# Patient Record
Sex: MALE | Race: WHITE | Employment: OTHER | URBAN - NONMETROPOLITAN AREA
[De-identification: names, ages, dates, MRNs, and addresses within clinical notes are randomized per-mention and may not be internally consistent; named-entity substitution may affect disease eponyms.]

---

## 2019-03-30 ENCOUNTER — HOSPITAL ENCOUNTER (INPATIENT)
Age: 79
LOS: 4 days | Discharge: NURSING, REHAB OR LONG-TERM CARE | DRG: 640 | End: 2019-04-03
Attending: FAMILY MEDICINE | Admitting: FAMILY MEDICINE
Payer: MEDICARE

## 2019-03-30 ENCOUNTER — APPOINTMENT (OUTPATIENT)
Dept: MRI IMAGING | Age: 79
DRG: 640 | End: 2019-03-30
Payer: MEDICARE

## 2019-03-30 ENCOUNTER — APPOINTMENT (OUTPATIENT)
Dept: GENERAL RADIOLOGY | Age: 79
DRG: 640 | End: 2019-03-30
Payer: MEDICARE

## 2019-03-30 ENCOUNTER — APPOINTMENT (OUTPATIENT)
Dept: CT IMAGING | Age: 79
DRG: 640 | End: 2019-03-30
Payer: MEDICARE

## 2019-03-30 DIAGNOSIS — R26.2 IMPAIRED AMBULATION: ICD-10-CM

## 2019-03-30 DIAGNOSIS — R62.7 ADULT FAILURE TO THRIVE: Primary | ICD-10-CM

## 2019-03-30 DIAGNOSIS — R41.0 DELIRIUM: ICD-10-CM

## 2019-03-30 PROBLEM — G93.41 ENCEPHALOPATHY, METABOLIC: Status: ACTIVE | Noted: 2019-03-30

## 2019-03-30 PROBLEM — R26.81 UNSTABLE GAIT: Chronic | Status: ACTIVE | Noted: 2019-03-30

## 2019-03-30 PROBLEM — F02.80 LATE ONSET ALZHEIMER'S DISEASE WITHOUT BEHAVIORAL DISTURBANCE (HCC): Chronic | Status: ACTIVE | Noted: 2019-03-30

## 2019-03-30 PROBLEM — G30.1 LATE ONSET ALZHEIMER'S DISEASE WITHOUT BEHAVIORAL DISTURBANCE (HCC): Chronic | Status: ACTIVE | Noted: 2019-03-30

## 2019-03-30 PROBLEM — E86.0 DEHYDRATION: Status: ACTIVE | Noted: 2019-03-30

## 2019-03-30 PROBLEM — R29.6 MULTIPLE FALLS: Chronic | Status: ACTIVE | Noted: 2019-03-30

## 2019-03-30 PROBLEM — I10 ESSENTIAL HYPERTENSION: Chronic | Status: ACTIVE | Noted: 2019-03-30

## 2019-03-30 PROBLEM — E78.00 PURE HYPERCHOLESTEROLEMIA: Chronic | Status: ACTIVE | Noted: 2019-03-30

## 2019-03-30 PROBLEM — J43.1 PANLOBULAR EMPHYSEMA (HCC): Chronic | Status: ACTIVE | Noted: 2019-03-30

## 2019-03-30 PROBLEM — G93.41 METABOLIC ENCEPHALOPATHY: Status: ACTIVE | Noted: 2019-03-30

## 2019-03-30 LAB
ACETAMINOPHEN LEVEL: <15 UG/ML
ALBUMIN SERPL-MCNC: 3.1 G/DL (ref 3.5–5.2)
ALP BLD-CCNC: 53 U/L (ref 40–130)
ALT SERPL-CCNC: 14 U/L (ref 5–41)
AMMONIA: 55 UMOL/L (ref 16–60)
ANION GAP SERPL CALCULATED.3IONS-SCNC: 10 MMOL/L (ref 7–19)
APTT: 29.6 SEC (ref 26–36.2)
AST SERPL-CCNC: 24 U/L (ref 5–40)
BASOPHILS ABSOLUTE: 0 K/UL (ref 0–0.2)
BASOPHILS RELATIVE PERCENT: 0.5 % (ref 0–1)
BILIRUB SERPL-MCNC: 0.4 MG/DL (ref 0.2–1.2)
BILIRUBIN URINE: NEGATIVE
BLOOD, URINE: NEGATIVE
BUN BLDV-MCNC: 11 MG/DL (ref 8–23)
CALCIUM SERPL-MCNC: 9.1 MG/DL (ref 8.8–10.2)
CHLORIDE BLD-SCNC: 98 MMOL/L (ref 98–111)
CLARITY: CLEAR
CO2: 29 MMOL/L (ref 22–29)
COLOR: YELLOW
CREAT SERPL-MCNC: 0.7 MG/DL (ref 0.5–1.2)
EOSINOPHILS ABSOLUTE: 0.1 K/UL (ref 0–0.6)
EOSINOPHILS RELATIVE PERCENT: 1.7 % (ref 0–5)
ETHANOL: <10 MG/DL (ref 0–0.08)
FOLATE: 13.2 NG/ML (ref 4.5–32.2)
GFR NON-AFRICAN AMERICAN: >60
GLUCOSE BLD-MCNC: 115 MG/DL (ref 74–109)
GLUCOSE URINE: NEGATIVE MG/DL
HCT VFR BLD CALC: 42.3 % (ref 42–52)
HEMOGLOBIN: 14.5 G/DL (ref 14–18)
INR BLD: 1.11 (ref 0.88–1.18)
IRON SATURATION: 42 % (ref 14–50)
IRON: 84 UG/DL (ref 59–158)
KETONES, URINE: NEGATIVE MG/DL
LEUKOCYTE ESTERASE, URINE: NEGATIVE
LYMPHOCYTES ABSOLUTE: 1.1 K/UL (ref 1.1–4.5)
LYMPHOCYTES RELATIVE PERCENT: 13.6 % (ref 20–40)
MAGNESIUM: 1.9 MG/DL (ref 1.6–2.4)
MCH RBC QN AUTO: 33.1 PG (ref 27–31)
MCHC RBC AUTO-ENTMCNC: 34.3 G/DL (ref 33–37)
MCV RBC AUTO: 96.6 FL (ref 80–94)
MONOCYTES ABSOLUTE: 1 K/UL (ref 0–0.9)
MONOCYTES RELATIVE PERCENT: 11.8 % (ref 0–10)
NEUTROPHILS ABSOLUTE: 5.9 K/UL (ref 1.5–7.5)
NEUTROPHILS RELATIVE PERCENT: 72 % (ref 50–65)
NITRITE, URINE: NEGATIVE
PDW BLD-RTO: 14.2 % (ref 11.5–14.5)
PH UA: 7 (ref 5–8)
PLATELET # BLD: 245 K/UL (ref 130–400)
PMV BLD AUTO: 9.6 FL (ref 9.4–12.4)
POTASSIUM SERPL-SCNC: 3.6 MMOL/L (ref 3.5–5)
PRO-BNP: 280 PG/ML (ref 0–1800)
PROTEIN UA: NEGATIVE MG/DL
PROTHROMBIN TIME: 13.7 SEC (ref 12–14.6)
RBC # BLD: 4.38 M/UL (ref 4.7–6.1)
SALICYLATE, SERUM: <3 MG/DL (ref 3–10)
SODIUM BLD-SCNC: 137 MMOL/L (ref 136–145)
SPECIFIC GRAVITY UA: 1.02 (ref 1–1.03)
T4 TOTAL: 4.7 UG/DL (ref 4.5–11.7)
TOTAL IRON BINDING CAPACITY: 198 UG/DL (ref 250–400)
TOTAL PROTEIN: 5.9 G/DL (ref 6.6–8.7)
TROPONIN: <0.01 NG/ML (ref 0–0.03)
TSH SERPL DL<=0.05 MIU/L-ACNC: 2.82 UIU/ML (ref 0.27–4.2)
URINE REFLEX TO CULTURE: NORMAL
UROBILINOGEN, URINE: 1 E.U./DL
VITAMIN B-12: 1056 PG/ML (ref 211–946)
WBC # BLD: 8.2 K/UL (ref 4.8–10.8)

## 2019-03-30 PROCEDURE — 85025 COMPLETE CBC W/AUTO DIFF WBC: CPT

## 2019-03-30 PROCEDURE — 71045 X-RAY EXAM CHEST 1 VIEW: CPT

## 2019-03-30 PROCEDURE — 85730 THROMBOPLASTIN TIME PARTIAL: CPT

## 2019-03-30 PROCEDURE — 99285 EMERGENCY DEPT VISIT HI MDM: CPT

## 2019-03-30 PROCEDURE — 81003 URINALYSIS AUTO W/O SCOPE: CPT

## 2019-03-30 PROCEDURE — 85610 PROTHROMBIN TIME: CPT

## 2019-03-30 PROCEDURE — G0480 DRUG TEST DEF 1-7 CLASSES: HCPCS

## 2019-03-30 PROCEDURE — 83550 IRON BINDING TEST: CPT

## 2019-03-30 PROCEDURE — 70450 CT HEAD/BRAIN W/O DYE: CPT

## 2019-03-30 PROCEDURE — 82140 ASSAY OF AMMONIA: CPT

## 2019-03-30 PROCEDURE — 6370000000 HC RX 637 (ALT 250 FOR IP): Performed by: FAMILY MEDICINE

## 2019-03-30 PROCEDURE — 84443 ASSAY THYROID STIM HORMONE: CPT

## 2019-03-30 PROCEDURE — 87040 BLOOD CULTURE FOR BACTERIA: CPT

## 2019-03-30 PROCEDURE — 83880 ASSAY OF NATRIURETIC PEPTIDE: CPT

## 2019-03-30 PROCEDURE — 36415 COLL VENOUS BLD VENIPUNCTURE: CPT

## 2019-03-30 PROCEDURE — 70551 MRI BRAIN STEM W/O DYE: CPT

## 2019-03-30 PROCEDURE — 1210000000 HC MED SURG R&B

## 2019-03-30 PROCEDURE — 84436 ASSAY OF TOTAL THYROXINE: CPT

## 2019-03-30 PROCEDURE — 2580000003 HC RX 258: Performed by: FAMILY MEDICINE

## 2019-03-30 PROCEDURE — 6360000002 HC RX W HCPCS: Performed by: FAMILY MEDICINE

## 2019-03-30 PROCEDURE — 84484 ASSAY OF TROPONIN QUANT: CPT

## 2019-03-30 PROCEDURE — 83735 ASSAY OF MAGNESIUM: CPT

## 2019-03-30 PROCEDURE — 80053 COMPREHEN METABOLIC PANEL: CPT

## 2019-03-30 PROCEDURE — 82746 ASSAY OF FOLIC ACID SERUM: CPT

## 2019-03-30 PROCEDURE — 93880 EXTRACRANIAL BILAT STUDY: CPT

## 2019-03-30 PROCEDURE — 82607 VITAMIN B-12: CPT

## 2019-03-30 PROCEDURE — 99285 EMERGENCY DEPT VISIT HI MDM: CPT | Performed by: FAMILY MEDICINE

## 2019-03-30 PROCEDURE — 83540 ASSAY OF IRON: CPT

## 2019-03-30 PROCEDURE — 93005 ELECTROCARDIOGRAM TRACING: CPT

## 2019-03-30 RX ORDER — RIVASTIGMINE TARTRATE 3 MG/1
3 CAPSULE ORAL 2 TIMES DAILY
Status: DISCONTINUED | OUTPATIENT
Start: 2019-03-30 | End: 2019-04-03 | Stop reason: HOSPADM

## 2019-03-30 RX ORDER — ACETAMINOPHEN 325 MG/1
650 TABLET ORAL EVERY 4 HOURS PRN
Status: DISCONTINUED | OUTPATIENT
Start: 2019-03-30 | End: 2019-04-03 | Stop reason: HOSPADM

## 2019-03-30 RX ORDER — ONDANSETRON 2 MG/ML
4 INJECTION INTRAMUSCULAR; INTRAVENOUS EVERY 6 HOURS PRN
Status: DISCONTINUED | OUTPATIENT
Start: 2019-03-30 | End: 2019-04-03 | Stop reason: HOSPADM

## 2019-03-30 RX ORDER — ATORVASTATIN CALCIUM 10 MG/1
10 TABLET, FILM COATED ORAL DAILY
COMMUNITY

## 2019-03-30 RX ORDER — TRAZODONE HYDROCHLORIDE 100 MG/1
100 TABLET ORAL NIGHTLY
COMMUNITY

## 2019-03-30 RX ORDER — DIVALPROEX SODIUM 250 MG/1
250 TABLET, DELAYED RELEASE ORAL 3 TIMES DAILY
Status: DISCONTINUED | OUTPATIENT
Start: 2019-03-30 | End: 2019-04-03 | Stop reason: HOSPADM

## 2019-03-30 RX ORDER — MELATONIN 10 MG
CAPSULE ORAL
COMMUNITY

## 2019-03-30 RX ORDER — SODIUM CHLORIDE 0.9 % (FLUSH) 0.9 %
10 SYRINGE (ML) INJECTION PRN
Status: DISCONTINUED | OUTPATIENT
Start: 2019-03-30 | End: 2019-04-03 | Stop reason: HOSPADM

## 2019-03-30 RX ORDER — LISINOPRIL 10 MG/1
10 TABLET ORAL DAILY
COMMUNITY

## 2019-03-30 RX ORDER — QUETIAPINE FUMARATE 50 MG/1
50 TABLET, EXTENDED RELEASE ORAL NIGHTLY
Status: DISCONTINUED | OUTPATIENT
Start: 2019-03-30 | End: 2019-04-03 | Stop reason: HOSPADM

## 2019-03-30 RX ORDER — TRAZODONE HYDROCHLORIDE 100 MG/1
100 TABLET ORAL NIGHTLY
Status: DISCONTINUED | OUTPATIENT
Start: 2019-03-30 | End: 2019-04-03 | Stop reason: HOSPADM

## 2019-03-30 RX ORDER — LORAZEPAM 2 MG/ML
1 INJECTION INTRAMUSCULAR EVERY 6 HOURS PRN
Status: DISCONTINUED | OUTPATIENT
Start: 2019-03-30 | End: 2019-04-03 | Stop reason: HOSPADM

## 2019-03-30 RX ORDER — ASPIRIN 325 MG
325 TABLET ORAL DAILY
Status: DISCONTINUED | OUTPATIENT
Start: 2019-03-30 | End: 2019-04-03 | Stop reason: HOSPADM

## 2019-03-30 RX ORDER — QUETIAPINE FUMARATE 50 MG/1
50 TABLET, EXTENDED RELEASE ORAL NIGHTLY
Status: ON HOLD | COMMUNITY
End: 2019-04-09 | Stop reason: HOSPADM

## 2019-03-30 RX ORDER — LISINOPRIL 10 MG/1
10 TABLET ORAL DAILY
Status: DISCONTINUED | OUTPATIENT
Start: 2019-03-30 | End: 2019-04-02

## 2019-03-30 RX ORDER — ATORVASTATIN CALCIUM 10 MG/1
10 TABLET, FILM COATED ORAL DAILY
Status: DISCONTINUED | OUTPATIENT
Start: 2019-03-30 | End: 2019-04-03 | Stop reason: HOSPADM

## 2019-03-30 RX ORDER — SODIUM CHLORIDE 9 MG/ML
INJECTION, SOLUTION INTRAVENOUS CONTINUOUS
Status: DISCONTINUED | OUTPATIENT
Start: 2019-03-30 | End: 2019-04-03 | Stop reason: HOSPADM

## 2019-03-30 RX ORDER — SODIUM CHLORIDE 0.9 % (FLUSH) 0.9 %
10 SYRINGE (ML) INJECTION EVERY 12 HOURS SCHEDULED
Status: DISCONTINUED | OUTPATIENT
Start: 2019-03-30 | End: 2019-04-03 | Stop reason: HOSPADM

## 2019-03-30 RX ORDER — MEMANTINE HYDROCHLORIDE 5 MG/1
10 TABLET ORAL 2 TIMES DAILY
Status: DISCONTINUED | OUTPATIENT
Start: 2019-03-30 | End: 2019-04-03 | Stop reason: HOSPADM

## 2019-03-30 RX ORDER — MEMANTINE HYDROCHLORIDE 10 MG/1
10 TABLET ORAL DAILY
COMMUNITY

## 2019-03-30 RX ORDER — ASPIRIN 325 MG
325 TABLET ORAL DAILY
COMMUNITY

## 2019-03-30 RX ORDER — DIVALPROEX SODIUM 250 MG/1
250 TABLET, DELAYED RELEASE ORAL 3 TIMES DAILY
COMMUNITY

## 2019-03-30 RX ORDER — HYDROCHLOROTHIAZIDE 25 MG/1
25 TABLET ORAL DAILY
COMMUNITY

## 2019-03-30 RX ORDER — SODIUM CHLORIDE 9 MG/ML
INJECTION, SOLUTION INTRAVENOUS CONTINUOUS
Status: DISCONTINUED | OUTPATIENT
Start: 2019-03-30 | End: 2019-03-30

## 2019-03-30 RX ORDER — HYDROCODONE BITARTRATE AND ACETAMINOPHEN 10; 325 MG/1; MG/1
1 TABLET ORAL EVERY 6 HOURS PRN
Status: ON HOLD | COMMUNITY
End: 2019-04-03 | Stop reason: HOSPADM

## 2019-03-30 RX ORDER — RIVASTIGMINE TARTRATE 3 MG/1
3 CAPSULE ORAL 2 TIMES DAILY
COMMUNITY

## 2019-03-30 RX ADMIN — MEMANTINE 10 MG: 5 TABLET ORAL at 20:38

## 2019-03-30 RX ADMIN — TRAZODONE HYDROCHLORIDE 100 MG: 100 TABLET ORAL at 20:38

## 2019-03-30 RX ADMIN — RIVASTIGMINE TARTRATE 3 MG: 3 CAPSULE ORAL at 20:38

## 2019-03-30 RX ADMIN — ENOXAPARIN SODIUM 40 MG: 40 INJECTION SUBCUTANEOUS at 18:51

## 2019-03-30 RX ADMIN — QUETIAPINE FUMARATE 50 MG: 50 TABLET, EXTENDED RELEASE ORAL at 20:38

## 2019-03-30 RX ADMIN — DIVALPROEX SODIUM 250 MG: 250 TABLET, DELAYED RELEASE ORAL at 20:38

## 2019-03-30 RX ADMIN — Medication 10 ML: at 20:39

## 2019-03-30 SDOH — HEALTH STABILITY: MENTAL HEALTH: HOW OFTEN DO YOU HAVE A DRINK CONTAINING ALCOHOL?: MONTHLY OR LESS

## 2019-03-30 SDOH — HEALTH STABILITY: MENTAL HEALTH: HOW MANY STANDARD DRINKS CONTAINING ALCOHOL DO YOU HAVE ON A TYPICAL DAY?: 1 OR 2

## 2019-03-30 ASSESSMENT — PAIN SCALES - GENERAL
PAINLEVEL_OUTOF10: 0
PAINLEVEL_OUTOF10: 0

## 2019-03-30 ASSESSMENT — ENCOUNTER SYMPTOMS
ABDOMINAL PAIN: 0
SHORTNESS OF BREATH: 0

## 2019-03-30 NOTE — ED PROVIDER NOTES
[facility] 5 SURG SERVICES  eMERGENCY dEPARTMENT eNCOUnter      Pt Name: Kate Mcraer  MRN: [id number]  Charlenegfcelina 1940  Date of evaluation: 3/30/2019  Provider: Mp Sweeney MD    CHIEF COMPLAINT       Chief Complaint   Patient presents with    Failure To Thrive     from home per dr Gabriella Rey for [facility] home placement. pt is confused and disoriented, has a hx of dementia         HISTORY OF PRESENT ILLNESS   (Location/Symptom, Timing/Onset,Context/Setting, Quality, Duration, Modifying Factors, Severity)  Note limiting factors. Kate Officer is a 67 y.o. male who presents to the emergency department      Patient sent out today for failure to thrive. Story of dementia. That he has \"not been doing so well\". When asked to elaborate indicates he has not been able to walk around or take care of himself he feels very weak and not remember if he ate breakfast and quickly forgets what time of day it is after he has told repeatedly. The patient does look like he has poorly kept and, Looks pale and dehydrated. NursingNotes were reviewed. REVIEW OF SYSTEMS    (2-9 systems for level 4, 10 or more for level 5)     Review of Systems   Constitutional: Positive for activity change and fatigue. HENT: Negative for congestion. Respiratory: Negative for shortness of breath. Cardiovascular: Negative for chest pain. Gastrointestinal: Negative for abdominal pain. Genitourinary:        Patient answers \"I don't know\" to questions about pain with urination or frequency   Musculoskeletal: Positive for gait problem. Skin: Positive for pallor. Neurological: Positive for weakness. Psychiatric/Behavioral: Positive for confusion, decreased concentration and sleep disturbance.             PAST MEDICALHISTORY     Past Medical History:   Diagnosis Date    Alzheimer's dementia     COPD with emphysema ([facility] [address]     Hyperlipidemia     Hypertension     Osteoarthritis     Sleep disturbance          SURGICAL HISTORY No past surgical history on file. CURRENT MEDICATIONS     Current Discharge Medication List      CONTINUE these medications which have NOT CHANGED    Details   lisinopril (PRINIVIL;ZESTRIL) 10 MG tablet Take 10 mg by mouth daily      aspirin 325 MG tablet Take 325 mg by mouth daily      hydrochlorothiazide (HYDRODIURIL) 25 MG tablet Take 25 mg by mouth daily      atorvastatin (LIPITOR) 10 MG tablet Take 10 mg by mouth daily      QUEtiapine (SEROQUEL XR) 50 MG extended release tablet Take 50 mg by mouth nightly      divalproex (DEPAKOTE) 250 MG DR tablet Take 250 mg by mouth 3 times daily      Melatonin 10 MG CAPS Take by mouth      HYDROcodone-acetaminophen (NORCO)  MG per tablet Take 1 tablet by mouth every 6 hours as needed for Pain. memantine (NAMENDA) 10 MG tablet Take 10 mg by mouth daily       traZODone (DESYREL) 100 MG tablet Take 100 mg by mouth nightly      rivastigmine (EXELON) 3 MG capsule Take 3 mg by mouth 2 times daily             ALLERGIES     Patient has no known allergies. FAMILY HISTORY     No family history on file. SOCIAL HISTORY       Social History     Socioeconomic History    Marital status:      Spouse name: None    Number of children: None    Years of education: None    Highest education level: None   Occupational History    None   Social Needs    Financial resource strain: None    Food insecurity:     Worry: None     Inability: None    Transportation needs:     Medical: None     Non-medical: None   Tobacco Use    Smoking status: Current Every Day Smoker     Types: Cigarettes     Start date: 1954    Smokeless tobacco: Never Used   Substance and Sexual Activity    Alcohol use:  Yes     Alcohol/week: 2.4 oz     Types: 4 Glasses of wine per week     Frequency: Monthly or less     Drinks per session: 1 or 2     Binge frequency: Never    Drug use: Never    Sexual activity: Not Currently   Lifestyle    Physical activity:     Days per week: None Minutes per session: None    Stress: None   Relationships    Social connections:     Talks on phone: None     Gets together: None     Attends Rastafari service: None     Active member of club or organization: None     Attends meetings of clubs or organizations: None     Relationship status: None    Intimate partner violence:     Fear of current or ex partner: None     Emotionally abused: None     Physically abused: None     Forced sexual activity: None   Other Topics Concern    None   Social History Narrative    None       SCREENINGS    Luna Coma Scale  Eye Opening: Spontaneous  Best Verbal Response: Confused  Best Motor Response: Obeys commands  Luna Coma Scale Score: 14        PHYSICAL EXAM    (up to 7 for level 4, 8 or more for level 5)     ED Triage Vitals [03/30/19 1245]   BP Temp Temp Source Pulse Resp SpO2 Height Weight   128/88 97.8 °F (36.6 °C) Oral 73 20 90 % -- 200 lb (90.7 kg)       Physical Exam   Constitutional: He appears cachectic. He is cooperative. He is easily aroused. HENT:   Head: Normocephalic. Mouth/Throat: Mucous membranes are dry. Neck: Normal range of motion. Cardiovascular: Normal rate and normal heart sounds. Pulmonary/Chest: Effort normal and breath sounds normal.   Abdominal: Soft. Bowel sounds are normal.   Neurological: He is alert and easily aroused. He is disoriented. No cranial nerve deficit. GCS eye subscore is 4. GCS verbal subscore is 4. GCS motor subscore is 6. Reflex Scores:       Patellar reflexes are 1+ on the right side and 1+ on the left side. Generalized motor weakness no focal issue found   Skin: There is pallor. Psychiatric: He has a normal mood and affect.        DIAGNOSTIC RESULTS     EKG: All EKG's areinterpreted by the Emergency Department Physician who either signs or Co-signs this chart in the absence of a cardiologist.        RADIOLOGY:  Non-plain film images such as CT, Ultrasound and MRI are read by the radiologist. Plain radiographic images are visualized and preliminarily interpreted bythe emergency physician with the below findings:          CT Head WO Contrast   Final Result   No acute findings. Signed by Dr Michoacano Figueroa on 3/30/2019 2:08 PM      XR CHEST PORTABLE   Final Result   Suboptimal technique. Grossly clear lungs. Signed by Dr Michoacano Figueroa on 3/30/2019 1:21 PM      VL DUP CAROTID BILATERAL    (Results Pending)   MRI Brain WO Contrast    (Results Pending)           LABS:  Labs Reviewed   CBC WITH AUTO DIFFERENTIAL - Abnormal; Notable for the following components:       Result Value    RBC 4.38 (*)     MCV 96.6 (*)     MCH 33.1 (*)     Neutrophils % 72.0 (*)     Lymphocytes % 13.6 (*)     Monocytes % 11.8 (*)     Monocytes # 1.00 (*)     All other components within normal limits   COMPREHENSIVE METABOLIC PANEL - Abnormal; Notable for the following components:    Glucose 115 (*)     Total Protein 5.9 (*)     Alb 3.1 (*)     All other components within normal limits   VITAMIN B12 - Abnormal; Notable for the following components:    Vitamin B-12 1056 (*)     All other components within normal limits   IRON AND TIBC - Abnormal; Notable for the following components:    TIBC 198 (*)     All other components within normal limits   CULTURE BLOOD #1   CULTURE BLOOD #2   TROPONIN   URINE RT REFLEX TO CULTURE   ETHANOL   SALICYLATE LEVEL   ACETAMINOPHEN LEVEL   T4   TSH WITHOUT REFLEX   BRAIN NATRIURETIC PEPTIDE   PROTIME-INR   APTT   AMMONIA   MAGNESIUM   FOLATE   VALPROIC ACID LEVEL, TOTAL   COMPREHENSIVE METABOLIC PANEL W/ REFLEX TO MG FOR LOW K   CBC WITH AUTO DIFFERENTIAL       All other labs were within normal range or not returned as of this dictation.     EMERGENCY DEPARTMENT COURSE and DIFFERENTIAL DIAGNOSIS/MDM:   Vitals:    Vitals:    03/30/19 1541 03/30/19 1647 03/30/19 1807 03/30/19 1827   BP: 120/87 121/76 (!) 158/98    Pulse: 72 79 69    Resp: 20 20 17    Temp: 97.5 °F (36.4 °C) 98.5 °F (36.9 °C) 97.6 °F (36.4 °C)    TempSrc: Temporal Oral Temporal    SpO2: 97% 97% 94%    Weight:    200 lb (90.7 kg)   Height:    5' 11\" (1.803 m)       MDM    Reassessment      CONSULTS:  IP CONSULT TO PALLIATIVE CARE  IP CONSULT TO SOCIAL WORK    PROCEDURES:  Unless otherwise noted below, none     Procedures    FINAL IMPRESSION      1. Adult failure to thrive    2. Impaired ambulation    3.  Delirium          DISPOSITION/PLAN   DISPOSITION Admitted 03/30/2019 04:17:23 PM      PATIENT REFERRED TO:  Tyrone Parker MD  [address] [id number] [phone]            DISCHARGE MEDICATIONS:  Current Discharge Medication List             (Please note that portions of this note were completed with a voice recognition program.  Efforts were made to edit thedictations but occasionally words are mis-transcribed.)    Hong Goetz MD (electronically signed)  Attending Emergency Physician         Shwetha Higuera MD  03/30/19 1036

## 2019-03-30 NOTE — H&P
[facility]  History and Physical    Patient:  Apple Vizcaino  MRN: [id number]    CHIEF COMPLAINT:  Multiple falls and mental status changes      PCP: Linda Schmidt MD    HISTORY OF PRESENT ILLNESS:   The patient is a 67 y.o. male who presents with multiple falls over the past couple of days. An \"not acting himself\". This morning they tried to get him out of bed and he could not bear weight and could not ambulate. He had a difficult time communicating with them as well. He slowly returned back to his usual self and then started acting more confused. He has some underlying dementia but this was different according to the family members. They brought into the emergency room and he was diagnosed with metabolic encephalopathy elected to be admitted for further delineation of care. The family states he is declined over the past week to 10 days but over the past couple days if noticing he cannot bear weight not follow commands and had significant change in mentation. There are no other precipitating or relieving factors. REVIEW OF SYSTEMS:    Past Medical History:      Diagnosis Date    Alzheimer's dementia     COPD with [facility]ysema ([facility] [address]     Hyperlipidemia     Hypertension     Osteoarthritis     Sleep disturbance        Past Surgical History:  No past surgical history on file. Medications Prior to Admission:    Prior to Admission medications    Medication Sig Start Date End Date Taking?  Authorizing Provider   lisinopril (PRINIVIL;ZESTRIL) 10 MG tablet Take 10 mg by mouth daily   Yes Historical Provider, MD   aspirin 325 MG tablet Take 325 mg by mouth daily   Yes Historical Provider, MD   hydrochlorothiazide (HYDRODIURIL) 25 MG tablet Take 25 mg by mouth daily   Yes Historical Provider, MD   atorvastatin (LIPITOR) 10 MG tablet Take 10 mg by mouth daily   Yes Historical Provider, MD   QUEtiapine (SEROQUEL XR) 50 MG extended release tablet Take 50 mg by mouth nightly   Yes Historical Provider, MD Not on file   Social History Narrative    Not on file       Family History:   No family history on file. Review of systems:    Con: no fever/chills or significant weight changes   Heent: denies HA, change in vision or hearing. No significant sinus drainage. They have noticed him having more difficulty swallowing. CV: denies CP, dyspnea on exertion, Palpitations. No change in exercise tolerance, he is not very active at home. Pulm: No cough, sputum production, denies hemoptysis -underlying COPD emphysema noted  GI: denies changes in bowel habits, no diarrhea or significant constipation. No BRBPR       or melena. He is incontinent of stool at times  : no dysuria, hesitancy or dysuria. Denies hematuria. Is incontinent of urine at times  Derm: no rashes or new lesions of concern. Psych: He does have some restlessness in the evening hours consistent with sundowning  Neuro: They have noticed she's not following directions and not as coherent as his baseline over the past couple of days. A full 14 point review of systems is otherwise negative outside listed above and HPI      Physical Exam:    Vitals: /87   Pulse 72   Temp 97.5 °F (36.4 °C) (Temporal)   Resp 20   Wt 200 lb (90.7 kg)   SpO2 97%     GENERAL: WD/WN not in acute distress, resting well in bed  HEENT: NC/AT PERRL, EOMI grossly, TM's clear, OP negative without sig erythema or exudate, neck is supple without masses or carotid bruit noted    CV: normal S1 and S2, no sig murmur, lift or gallop, normal PMI  CHEST: clear to auscultation both anterior and posterior, no rales rhonchi or wheeze appreciated. ABD: soft NT/ND with normoactive BS noted. No guarding or rebounding noted  /rectal: deferred  EXT: no cyanosis or clubbing noted, normal ROM  DERM: skin is warm and dry without sig rashes on exposed areas  PSYCH: Affect seems to be normal he does have some depression over his deterioration of health.   NEURO: CN 2-12 apper grossly intact without sig deficits in motor or sensory, he does have a remarkably impaired short-term memory       CBC:   Recent Labs     03/30/19  1308   WBC 8.2   HGB 14.5        BMP:    Recent Labs     03/30/19  1308      K 3.6   CL 98   CO2 29   BUN 11   CREATININE 0.7   GLUCOSE 115*     Hepatic:   Recent Labs     03/30/19  1308   AST 24   ALT 14   BILITOT 0.4   ALKPHOS 53     Troponin T:   Recent Labs     03/30/19  1308   TROPONINI <0.01     Pro-BNP: No results for input(s): BNP in the last 72 hours. Lipids: No results for input(s): CHOL, HDL in the last 72 hours. Invalid input(s): LDLCALCU  ABGs: No results found for: PHART, PO2ART, [id number]  INR:   Recent Labs     03/30/19  1308   INR 1.11     -----------------------------------------------------------------  EKG: No significant changes noted  Radiology:     Ct Head Wo Contrast    Result Date: 3/30/2019  History: 71-year-old with altered mental status. Reference None available. Technique Unenhanced CT imaging of the brain/head was performed. Automated exposure control was utilized to limit radiation dose.  mGy-cm Findings No acute infarction, hemorrhage, or mass. Diffuse moderate atrophy. No hydrocephalus. No extra-axial collection. Mild chronic small vessel ischemia the cerebral white matter, not greater than expected for age. 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 The basal cisterns are clear. Mild mucosal thickening in the mid/posterior right ethmoid air cells. The calvarium is intact. No acute findings. Signed by Dr Los Jansen on 3/30/2019 2:08 PM    Xr Chest Portable    Result Date: 3/30/2019  History: 71-year-old with congestion. Reference: None. Findings: Frontal chest radiograph performed. Patient is rotated and leaning. Heart is not well assessed as a result. Lungs are grossly clear. No pleural effusion or pneumothorax. No fractures. Suboptimal technique. Grossly clear lungs. Signed by Dr Jones on 3/30/2019 1:21 PM      Assessment and Plan   1. Active Problems:    Encephalopathy, metabolic    Dehydration    Multiple falls    Late onset Alzheimer's disease without behavioral disturbance    Unstable gait    Essential hypertension    Pure hypercholesterolemia    Panlobular emphysema (HCC)  Resolved Problems:    * No resolved hospital problems. *  Mental status changes-metabolic encephalopathy. I don't know if this is related to his old stroke in progression of his underlying dementia or an acute event going on. We're going to culture his blood and urine and place him on broad-spectrum IV antibiotics. Will get physical therapy and occupational therapy to evaluate. He's had more difficulty swallowing so we'll get a speech eval along with a formal Mini-Mental Status exam. We'll complete the workup with carotid Dopplers echocardiogram and MRI of the brain. A Mago Joyce and open conversation held with the family concerning long-term program prognosis and expected deterioration of clinical condition over time. Everyone is aware and agrees with the treatment plan as outlined above.     Miguel Nguyễn  .  3/30/2019

## 2019-03-30 NOTE — PROGRESS NOTES
4 Eyes Skin Assessment    Fadi Pennington is being assessed upon: Admission    I agree that I, Rick Hudson, along with Marry Ornelas RN (either 2 RN's or 1 LPN and 1 RN) have performed a thorough Head to Toe Skin Assessment on the patient. ALL assessment sites listed below have been assessed. Areas assessed by both nurses:     [x]   Head, Face, and Ears   [x]   Shoulders, Back, and Chest  [x]   Arms, Elbows, and Hands   [x]   Coccyx, Sacrum, and Ischium  [x]   Legs, Feet, and Heels    Does the Patient have Skin Breakdown? Yes, wound(s) noted upon assessment. It is the responsibility of the Primary Nurse to assure that the following documentation, preventions, orders, and consults are complete on the above noted wound(s): Wound LDA initiated. LDA Flowsheet Documentation includes the Loree-wound, Wound Assessment, Measurements, Dressing Treatment, Drainage, and Color.    Bilateral Ankles redness  Right Hip stage 2 pressure sore  Redness on Bilat Elbows    Braulio Prevention initiated: Yes  Wound Care Orders initiated: Yes    [id number]  nurse consulted for Pressure Injury (Stage 3,4, Unstageable, DTI, NWPT, and Complex wounds) and New or Established Ostomies: No        Primary Nurse eSignature: Rick Hudson RN on 3/30/2019 at 6:38 PM      Co-Signer eSignature: Electronically signed by Skip Sifuentes LPN on 8/75/72 at 5:58 PM

## 2019-03-30 NOTE — ED NOTES
Bed: 05  Expected date:   Expected time:   Means of arrival:   Comments:  EMS     Guanako Marquez, INDRA  03/30/19 1112

## 2019-03-31 LAB
ALBUMIN SERPL-MCNC: 3.1 G/DL (ref 3.5–5.2)
ALP BLD-CCNC: 58 U/L (ref 40–130)
ALT SERPL-CCNC: 16 U/L (ref 5–41)
ANION GAP SERPL CALCULATED.3IONS-SCNC: 13 MMOL/L (ref 7–19)
AST SERPL-CCNC: 25 U/L (ref 5–40)
BASOPHILS ABSOLUTE: 0.1 K/UL (ref 0–0.2)
BASOPHILS RELATIVE PERCENT: 0.5 % (ref 0–1)
BILIRUB SERPL-MCNC: 0.4 MG/DL (ref 0.2–1.2)
BUN BLDV-MCNC: 9 MG/DL (ref 8–23)
CALCIUM SERPL-MCNC: 9.4 MG/DL (ref 8.8–10.2)
CHLORIDE BLD-SCNC: 96 MMOL/L (ref 98–111)
CO2: 31 MMOL/L (ref 22–29)
CREAT SERPL-MCNC: 0.6 MG/DL (ref 0.5–1.2)
EKG P AXIS: NORMAL DEGREES
EKG P-R INTERVAL: NORMAL MS
EKG Q-T INTERVAL: 418 MS
EKG QRS DURATION: 118 MS
EKG QTC CALCULATION (BAZETT): 440 MS
EKG T AXIS: 166 DEGREES
EOSINOPHILS ABSOLUTE: 0 K/UL (ref 0–0.6)
EOSINOPHILS RELATIVE PERCENT: 0.4 % (ref 0–5)
GFR NON-AFRICAN AMERICAN: >60
GLUCOSE BLD-MCNC: 101 MG/DL (ref 74–109)
HCT VFR BLD CALC: 44 % (ref 42–52)
HEMOGLOBIN: 15.1 G/DL (ref 14–18)
LV EF: 55 %
LVEF MODALITY: NORMAL
LYMPHOCYTES ABSOLUTE: 1 K/UL (ref 1.1–4.5)
LYMPHOCYTES RELATIVE PERCENT: 10.3 % (ref 20–40)
MAGNESIUM: 1.9 MG/DL (ref 1.6–2.4)
MCH RBC QN AUTO: 32.5 PG (ref 27–31)
MCHC RBC AUTO-ENTMCNC: 34.3 G/DL (ref 33–37)
MCV RBC AUTO: 94.8 FL (ref 80–94)
MONOCYTES ABSOLUTE: 1.2 K/UL (ref 0–0.9)
MONOCYTES RELATIVE PERCENT: 11.9 % (ref 0–10)
NEUTROPHILS ABSOLUTE: 7.6 K/UL (ref 1.5–7.5)
NEUTROPHILS RELATIVE PERCENT: 76.5 % (ref 50–65)
PDW BLD-RTO: 14 % (ref 11.5–14.5)
PLATELET # BLD: 269 K/UL (ref 130–400)
PMV BLD AUTO: 9.9 FL (ref 9.4–12.4)
POTASSIUM REFLEX MAGNESIUM: 3.2 MMOL/L (ref 3.5–5)
RBC # BLD: 4.64 M/UL (ref 4.7–6.1)
SODIUM BLD-SCNC: 140 MMOL/L (ref 136–145)
TOTAL PROTEIN: 6.1 G/DL (ref 6.6–8.7)
VALPROIC ACID LEVEL: 42.2 UG/ML (ref 50–100)
WBC # BLD: 10 K/UL (ref 4.8–10.8)

## 2019-03-31 PROCEDURE — 51798 US URINE CAPACITY MEASURE: CPT

## 2019-03-31 PROCEDURE — 2580000003 HC RX 258: Performed by: FAMILY MEDICINE

## 2019-03-31 PROCEDURE — 2700000000 HC OXYGEN THERAPY PER DAY

## 2019-03-31 PROCEDURE — 1210000000 HC MED SURG R&B

## 2019-03-31 PROCEDURE — 51701 INSERT BLADDER CATHETER: CPT

## 2019-03-31 PROCEDURE — 6370000000 HC RX 637 (ALT 250 FOR IP): Performed by: PHYSICIAN ASSISTANT

## 2019-03-31 PROCEDURE — 94660 CPAP INITIATION&MGMT: CPT

## 2019-03-31 PROCEDURE — C8929 TTE W OR WO FOL WCON,DOPPLER: HCPCS

## 2019-03-31 PROCEDURE — 85025 COMPLETE CBC W/AUTO DIFF WBC: CPT

## 2019-03-31 PROCEDURE — 80053 COMPREHEN METABOLIC PANEL: CPT

## 2019-03-31 PROCEDURE — 93880 EXTRACRANIAL BILAT STUDY: CPT

## 2019-03-31 PROCEDURE — 6370000000 HC RX 637 (ALT 250 FOR IP): Performed by: FAMILY MEDICINE

## 2019-03-31 PROCEDURE — 6360000004 HC RX CONTRAST MEDICATION: Performed by: INTERNAL MEDICINE

## 2019-03-31 PROCEDURE — 83735 ASSAY OF MAGNESIUM: CPT

## 2019-03-31 PROCEDURE — 36415 COLL VENOUS BLD VENIPUNCTURE: CPT

## 2019-03-31 PROCEDURE — 6360000002 HC RX W HCPCS: Performed by: FAMILY MEDICINE

## 2019-03-31 PROCEDURE — 80164 ASSAY DIPROPYLACETIC ACD TOT: CPT

## 2019-03-31 RX ORDER — POTASSIUM CHLORIDE 20 MEQ/1
40 TABLET, EXTENDED RELEASE ORAL ONCE
Status: COMPLETED | OUTPATIENT
Start: 2019-03-31 | End: 2019-03-31

## 2019-03-31 RX ADMIN — LORAZEPAM 1 MG: 2 INJECTION INTRAMUSCULAR; INTRAVENOUS at 11:02

## 2019-03-31 RX ADMIN — LORAZEPAM 1 MG: 2 INJECTION INTRAMUSCULAR; INTRAVENOUS at 00:15

## 2019-03-31 RX ADMIN — Medication 10 ML: at 21:00

## 2019-03-31 RX ADMIN — POTASSIUM CHLORIDE 40 MEQ: 20 TABLET, EXTENDED RELEASE ORAL at 11:02

## 2019-03-31 RX ADMIN — LISINOPRIL 10 MG: 10 TABLET ORAL at 07:55

## 2019-03-31 RX ADMIN — PERFLUTREN 2.2 MG: 6.52 INJECTION, SUSPENSION INTRAVENOUS at 10:44

## 2019-03-31 RX ADMIN — ATORVASTATIN CALCIUM 10 MG: 10 TABLET, FILM COATED ORAL at 07:56

## 2019-03-31 RX ADMIN — ENOXAPARIN SODIUM 40 MG: 40 INJECTION SUBCUTANEOUS at 19:04

## 2019-03-31 RX ADMIN — SODIUM CHLORIDE: 9 INJECTION, SOLUTION INTRAVENOUS at 19:53

## 2019-03-31 RX ADMIN — DIVALPROEX SODIUM 250 MG: 250 TABLET, DELAYED RELEASE ORAL at 07:56

## 2019-03-31 RX ADMIN — RIVASTIGMINE TARTRATE 3 MG: 3 CAPSULE ORAL at 07:55

## 2019-03-31 RX ADMIN — ASPIRIN 325 MG ORAL TABLET 325 MG: 325 PILL ORAL at 07:56

## 2019-03-31 RX ADMIN — MEMANTINE 10 MG: 5 TABLET ORAL at 07:55

## 2019-03-31 RX ADMIN — ONDANSETRON HYDROCHLORIDE 4 MG: 2 INJECTION, SOLUTION INTRAVENOUS at 00:15

## 2019-03-31 NOTE — PROGRESS NOTES
PRELIMINARY REPORT    RT CCA= 83/10      LT CCA= 86/8        ICA=  69/14             ICA= 80/16  BILATERAL ANTEGRADE VERTEBRAL ARTERIES    PENDING FINAL REPORT

## 2019-03-31 NOTE — PROGRESS NOTES
[facility]  Daily Progress Note  Apple Vizcaino  MRN: [id number] LOS: 1    Admit Date: 3/30/2019   3/31/2019 8:25 AM          CC: remains pleasantly confused      Interval History:    Reviewed overnight events and nursing notes. Status:  improved  Denies any chest pain or shortness of breath  Reviewed workup thus far  No new complaints voiced    DIET:  DIET DENTAL SOFT;    Medications:      sodium chloride        aspirin  325 mg Oral Daily    atorvastatin  10 mg Oral Daily    divalproex  250 mg Oral TID    lisinopril  10 mg Oral Daily    memantine  10 mg Oral BID    QUEtiapine  50 mg Oral Nightly    rivastigmine  3 mg Oral BID    traZODone  100 mg Oral Nightly    sodium chloride flush  10 mL Intravenous 2 times per day    enoxaparin  40 mg Subcutaneous Q24H       Data:     Code Status: DNR-CC    No family history on file. Social History     Socioeconomic History    Marital status:      Spouse name: Not on file    Number of children: Not on file    Years of education: Not on file    Highest education level: Not on file   Occupational History    Not on file   Social Needs    Financial resource strain: Not on file    Food insecurity:     Worry: Not on file     Inability: Not on file    Transportation needs:     Medical: Not on file     Non-medical: Not on file   Tobacco Use    Smoking status: Current Every Day Smoker     Types: Cigarettes     Start date: 12    Smokeless tobacco: Never Used   Substance and Sexual Activity    Alcohol use:  Yes     Alcohol/week: 2.4 oz     Types: 4 Glasses of wine per week     Frequency: Monthly or less     Drinks per session: 1 or 2     Binge frequency: Never    Drug use: Never    Sexual activity: Not Currently   Lifestyle    Physical activity:     Days per week: Not on file     Minutes per session: Not on file    Stress: Not on file   Relationships    Social connections:     Talks on phone: Not on file     Gets together: Not on file Attends Rastafari service: Not on file     Active member of club or organization: Not on file     Attends meetings of clubs or organizations: Not on file     Relationship status: Not on file    Intimate partner violence:     Fear of current or ex partner: Not on file     Emotionally abused: Not on file     Physically abused: Not on file     Forced sexual activity: Not on file   Other Topics Concern    Not on file   Social History Narrative    Not on file       Labs:  Hematology:  Recent Labs     19  1308 19  0213   WBC 8.2 10.0   HGB 14.5 15.1   HCT 42.3 44.0    269   INR 1.11  --      Chemistry:  Recent Labs     19  1308 19  0213    140   K 3.6 3.2*   CL 98 96*   CO2 29 31*   GLUCOSE 115* 101   BUN 11 9   CREATININE 0.7 0.6   MG 1.9 1.9   ANIONGAP 10 13   LABGLOM >60 >60   CALCIUM 9.1 9.4   TROPONINI <0.01  --      Recent Labs     19  1308 19  0213   PROT 5.9* 6.1*   LABALBU 3.1* 3.1*   W2NMAXK 4.7  --    TSH 2.820  --    AST 24 25   ALT 14 16   ALKPHOS 53 58   BILITOT 0.4 0.4   AMMONIA 55  --        Objective:     Vitals: BP (!) 147/73   Pulse 104   Temp 97.7 °F (36.5 °C) (Temporal)   Resp 18   Ht 5' 11\" (1.803 m)   Wt 200 lb (90.7 kg)   SpO2 92%   BMI 27.89 kg/m²      Intake/Output Summary (Last 24 hours) at 3/31/2019 0825  Last data filed at 3/31/2019 0542  Gross per 24 hour   Intake 900 ml   Output 550 ml   Net 350 ml    Temp (24hrs), Av.7 °F (36.5 °C), Min:97.3 °F (36.3 °C), Max:98.5 °F (36.9 °C)    Glucose:  No results for input(s): POCGLU in the last 72 hours.   Physical Examination:   General appearance - alert, well appearing, and in no distress and oriented to person, place, and time  Mental status - alert, oriented to person, place,  normal mood, behavior, speech, dress, motor activity, and thought processes  Eyes - pupils equal and reactive, extraocular eye movements intact  Mouth - mucous membranes moist, pharynx normal without lesions  Neck - supple, no significant adenopathy  Lymphatics - no palpable lymphadenopathy, no hepatosplenomegaly  Chest - clear to auscultation, no wheezes, rales or rhonchi, symmetric air entry, no tachypnea, retractions or cyanosis  Heart - normal rate, regular rhythm, normal S1, S2, no murmurs, rubs, clicks or gallops  Abdomen - soft, nontender, nondistended, no masses or organomegaly bowel sounds normal  Neurological - A & O x1 onlyMusculoskeletal - no joint tenderness, deformity or swelling  Extremities - peripheral pulses normal, no pedal edema, no clubbing or cyanosis  Skin - normal coloration and turgor, no rashes, no suspicious skin lesions noted    Assessment and Plan:     Primary Problem:  <principal problem not specified>    Hospital Problem list:  Active Problems:    Encephalopathy, metabolic    Dehydration    Multiple falls    Late onset Alzheimer's disease without behavioral disturbance    Unstable gait    Essential hypertension    Pure hypercholesterolemia    Panlobular emphysema (HCC)    Metabolic encephalopathy  Resolved Problems:    * No resolved hospital problems. *      PMH:  Past Medical History:   Diagnosis Date    Alzheimer's dementia     COPD with emphysema ([facility] [address]     Hyperlipidemia     Hypertension     Osteoarthritis     Sleep disturbance        Treatment Plan:  Awaiting physical therapy evaluation. Patient seems to have improvement since admission regarding mental status  MRI shows chronic disease, CT head normal, carotids negative  B12, thyroid, ammonia level and broad panel labs and workup thus far --essentially normal  Orders to replace potassium  Mental status changes likely progression of underlying dementia  Speech for formal Mini-Mental status exam  Discharge planning: Skilled nursing facility. Social service consult  Reviewed treatment plans with the patient and/or family. 20 minutes spent in face to face interaction and coordination of care.      Electronically signed by Jen Kramer LUL on 3/31/2019 at 8:25 AM     Suspect will need SNF  Family conference  PT to eval today    The patient was seen on rounds today. Reviewed the last 24 hours of labs and x-rays that are available. Updated overnight status with nursing staff. Reviewed the case with Sobeida Jones PA-C. All questions were answered to the patient's/family's understanding. Patient is medically stable, please see orders for further details. I have discussed the care of Ashley Sullivan, including pertinent history and exam findings with the ARNP/PA. I have seen and examined the patient and the key elements of all parts of the encounter have been performed by me. I agree with the assessment and plan as outlined by the ARNP/PA. Please refer to my separate note for complete documentation.      Electronically signed by Mela Mccarty MD on 3/31/2019 at 8:48 AM

## 2019-03-31 NOTE — PROGRESS NOTES
Dr. Jackson Lafferty informed of urinary retention. Bladder scan of 555.  Patient was straight cathed by me with Piper Leung at bedside to assist. Electronically signed by Rikki Wallace RN on 3/31/2019 at 3:15 PM

## 2019-04-01 PROBLEM — Z51.5 PALLIATIVE CARE PATIENT: Status: ACTIVE | Noted: 2019-04-01

## 2019-04-01 LAB
ANION GAP SERPL CALCULATED.3IONS-SCNC: 10 MMOL/L (ref 7–19)
BUN BLDV-MCNC: 8 MG/DL (ref 8–23)
CALCIUM SERPL-MCNC: 8.7 MG/DL (ref 8.8–10.2)
CHLORIDE BLD-SCNC: 100 MMOL/L (ref 98–111)
CO2: 27 MMOL/L (ref 22–29)
CREAT SERPL-MCNC: 0.5 MG/DL (ref 0.5–1.2)
GFR NON-AFRICAN AMERICAN: >60
GLUCOSE BLD-MCNC: 90 MG/DL (ref 74–109)
HCT VFR BLD CALC: 44.9 % (ref 42–52)
HEMOGLOBIN: 14.5 G/DL (ref 14–18)
MCH RBC QN AUTO: 32.3 PG (ref 27–31)
MCHC RBC AUTO-ENTMCNC: 32.3 G/DL (ref 33–37)
MCV RBC AUTO: 100 FL (ref 80–94)
PDW BLD-RTO: 14.4 % (ref 11.5–14.5)
PLATELET # BLD: 232 K/UL (ref 130–400)
PMV BLD AUTO: 10.2 FL (ref 9.4–12.4)
POTASSIUM SERPL-SCNC: 4.5 MMOL/L (ref 3.5–5)
RBC # BLD: 4.49 M/UL (ref 4.7–6.1)
SODIUM BLD-SCNC: 137 MMOL/L (ref 136–145)
WBC # BLD: 10.7 K/UL (ref 4.8–10.8)

## 2019-04-01 PROCEDURE — 6370000000 HC RX 637 (ALT 250 FOR IP): Performed by: FAMILY MEDICINE

## 2019-04-01 PROCEDURE — 2580000003 HC RX 258: Performed by: FAMILY MEDICINE

## 2019-04-01 PROCEDURE — 97166 OT EVAL MOD COMPLEX 45 MIN: CPT

## 2019-04-01 PROCEDURE — 94660 CPAP INITIATION&MGMT: CPT

## 2019-04-01 PROCEDURE — 80048 BASIC METABOLIC PNL TOTAL CA: CPT

## 2019-04-01 PROCEDURE — 85027 COMPLETE CBC AUTOMATED: CPT

## 2019-04-01 PROCEDURE — 2700000000 HC OXYGEN THERAPY PER DAY

## 2019-04-01 PROCEDURE — 36415 COLL VENOUS BLD VENIPUNCTURE: CPT

## 2019-04-01 PROCEDURE — 92610 EVALUATE SWALLOWING FUNCTION: CPT

## 2019-04-01 PROCEDURE — 97535 SELF CARE MNGMENT TRAINING: CPT

## 2019-04-01 PROCEDURE — 1210000000 HC MED SURG R&B

## 2019-04-01 PROCEDURE — 6360000002 HC RX W HCPCS: Performed by: FAMILY MEDICINE

## 2019-04-01 RX ORDER — TAMSULOSIN HYDROCHLORIDE 0.4 MG/1
0.4 CAPSULE ORAL DAILY
Status: DISCONTINUED | OUTPATIENT
Start: 2019-04-01 | End: 2019-04-03 | Stop reason: HOSPADM

## 2019-04-01 RX ADMIN — DIVALPROEX SODIUM 250 MG: 250 TABLET, DELAYED RELEASE ORAL at 14:30

## 2019-04-01 RX ADMIN — TRAZODONE HYDROCHLORIDE 100 MG: 100 TABLET ORAL at 22:02

## 2019-04-01 RX ADMIN — DIVALPROEX SODIUM 250 MG: 250 TABLET, DELAYED RELEASE ORAL at 09:07

## 2019-04-01 RX ADMIN — MEMANTINE 10 MG: 5 TABLET ORAL at 09:07

## 2019-04-01 RX ADMIN — ASPIRIN 325 MG ORAL TABLET 325 MG: 325 PILL ORAL at 09:07

## 2019-04-01 RX ADMIN — ATORVASTATIN CALCIUM 10 MG: 10 TABLET, FILM COATED ORAL at 09:07

## 2019-04-01 RX ADMIN — RIVASTIGMINE TARTRATE 3 MG: 3 CAPSULE ORAL at 09:07

## 2019-04-01 RX ADMIN — ENOXAPARIN SODIUM 40 MG: 40 INJECTION SUBCUTANEOUS at 17:38

## 2019-04-01 RX ADMIN — QUETIAPINE FUMARATE 50 MG: 50 TABLET, EXTENDED RELEASE ORAL at 22:03

## 2019-04-01 RX ADMIN — SODIUM CHLORIDE: 9 INJECTION, SOLUTION INTRAVENOUS at 22:02

## 2019-04-01 RX ADMIN — LISINOPRIL 10 MG: 10 TABLET ORAL at 09:07

## 2019-04-01 RX ADMIN — DIVALPROEX SODIUM 250 MG: 250 TABLET, DELAYED RELEASE ORAL at 22:02

## 2019-04-01 RX ADMIN — TAMSULOSIN HYDROCHLORIDE 0.4 MG: 0.4 CAPSULE ORAL at 09:07

## 2019-04-01 RX ADMIN — Medication 10 ML: at 09:11

## 2019-04-01 RX ADMIN — MEMANTINE 10 MG: 5 TABLET ORAL at 22:03

## 2019-04-01 RX ADMIN — RIVASTIGMINE TARTRATE 3 MG: 3 CAPSULE ORAL at 22:03

## 2019-04-01 ASSESSMENT — ENCOUNTER SYMPTOMS: SHORTNESS OF BREATH: 0

## 2019-04-01 NOTE — PROGRESS NOTES
Speech Language Pathology  Facility/Department: [facility] SURG SERVICES   BEDSIDE SWALLOW EVALUATION    NAME: Tayler Geiger  : 1940  MRN: [id number]    ADMISSION DATE: 3/30/2019  ADMITTING DIAGNOSIS: has Encephalopathy, metabolic; Dehydration; Multiple falls; Late onset Alzheimer's disease without behavioral disturbance; Unstable gait; Essential hypertension; Pure hypercholesterolemia; Panlobular emphysema ([address] Metabolic encephalopathy; and Palliative care patient on their problem list.    Date of Eval: 2019  Evaluating Therapist: Maria Del Carmen Messer    Current Diet level:  Current Diet : NPO  Current Liquid Diet : NPO    Reason for Referral  Tayler Geiger was referred for a bedside swallow evaluation to assess the efficiency of his swallow function, identify signs and symptoms of aspiration and make recommendations regarding safe dietary consistencies, effective compensatory strategies, and safe eating environment. Impression  Assessed patient's swallowing function. Patient exhibits slow, decreased oral prep of more solid consistencies, inconsistently fast oral transit and suspected swallow delay with thin liquids, and very sluggish, mildly decreased laryngeal elevation for swallow airway protection. Even so, no outward S/S penetration/aspiration was noted with any ice chip trial, puree consistency trial, honey thick H2O trial, nectar thick H2O trial, or thin H2O trial administered during evaluation this date. At this time, would trial puree consistency and nectar thick liquids. Recommend meds crushed in pudding or applesauce. Assist feed. Thank you for this consult. Treatment Plan  Requires SLP Intervention: Yes    Recommended Diet and Intervention  Diet Solids Recommendation: Dysphagia I Pureed  Liquid Consistency Recommendation: Nectar  Recommended Form of Meds: Crushed in puree as able  Therapeutic Interventions: Patient/Family education;Diet tolerance monitoring; Therapeutic PO trials with SLP    Compensatory Swallowing Strategies  Compensatory Swallowing Strategies: Upright as possible for all oral intake;Assist feed;Small bites/sips;Eat/Feed slowly; Alternate solids and liquids; Remain upright for 30-45 minutes after meals    Treatment/Goals  Timeframe for Short-term Goals: 1x/day for 3 days   Goal 1: Patient will tolerate puree consistency and nectar thick liquids with min S/S penetration/aspiration during PO intake. Goal 2: Patient staff will follow swallow safety recommendations to decrease risk of penetration/aspiration during PO intake. Goal 3: Re-assessment of swallow function for potential diet upgrade. General  Chart Reviewed: Yes  Behavior/Cognition: Alert; Cooperative  Communication Observation: (SLP ranked functional intelligibility of speech for unfamiliar listeners at 100% in verbalizations. )  Follows Directions: Simple  Dentition: Dentures top;Dentures bottom  Patient Positioning: Upright in bed  Volitional Cough: Weak   Volitional Swallow: Delayed  Consistencies Administered: Ice Chips;Puree; Honey - cup;Nectar - cup; Thin - cup    Assessed patient's swallowing function with the following observations noted:    Oral Phase Dysfunction  Oral Phase: Exceptions  Oral Phase Dysfunction  Impaired Mastication: Ice chips(Patient exhibited decreased vertical and rotary jaw movement during oral prep of 1/2 teaspoon ice chip trials presented by SLP. )  Suspected Premature Bolus Loss: Puree; Thin - cup(Oral transit of puree consistency trials, presented by SLP, varied from 1-3 seconds in length. Oral transit of thin H2O trials, presented via cup by SLP, was considered to be inconsistently fast.)  Oral Phase - Comment: Oral transit of ice chip trials primarily measured 1-2 seconds in length. No puree consistency residue was noted post swallows. Oral transit of honey thick H2O trials and nectar thick H2O trials, presented via cup by SLP, primarily measured 1 second in length.       Indicators of Pharyngeal Phase Dysfunction  Pharyngeal Phase: Exceptions  Indicators of Pharyngeal Phase Dysfunction  Delayed Swallow: Thin - cup(Suspect secondary to oral transit times.)  Decreased Laryngeal Elevation: (Laryngeal elevation was considered to be very sluggish and mildly decreased for swallow airway protection.)  Pharyngeal: No outward S/S penetration/aspiration was noted with any ice chip trial, puree consistency trial, honey thick H2O trial, or nectar thick H2O trial. Despite exhibiting inconsistently fast oral transit and suspected swallow delay , no outward S/S penetration/aspiration was observed with any thin H2O trial administered during assessment. At this time, would trial puree consistency and nectar thick liquids. Recommend meds crushed in pudding or applesauce. Assist feed.      Electronically signed by NATALIA Jeffers on 4/1/2019 at 12:45 PM

## 2019-04-01 NOTE — PROGRESS NOTES
Nutrition Assessment    Type and Reason for Visit: Initial, Positive Nutrition Screen    Nutrition Recommendations: Will monitor nutritional progression    Nutrition Assessment: NPO at time of visit. Has since been advanced to Dysphagia I w/ Nectar thick liquids per SLP. Pt is nutritional compromised AEB inadequate PO intake and difficulty swallowing. At risk for ongoing compromise d/t unmet nutritional needs and h/o dehydration. Will monitor nutritional progression    Malnutrition Assessment:  · Malnutrition Status: At risk for malnutrition  · Context: Chronic illness  · Findings of the 6 clinical characteristics of malnutrition (Minimum of 2 out of 6 clinical characteristics is required to make the diagnosis of moderate or severe Protein Calorie Malnutrition based on AND/ASPEN Guidelines):  1. Energy Intake-Less than or equal to 50% of estimated energy requirement, Unable to assess    2. Weight Loss-Unable to assess, unable to assess  3. Fat Loss-Unable to assess,    4. Muscle Loss-Unable to assess,    5. Fluid Accumulation-No significant fluid accumulation(non-pitting), Generalized  6.   Strength-Not measured    Nutrition Risk Level: High    Nutrient Needs:  · Estimated Daily Total Kcal: 1608-3192 kcal  · Estimated Daily Protein (g): 101-156 g  · Estimated Daily Total Fluid (ml/day): 3634-2761 ml    Nutrition Diagnosis:   · Problem: Inadequate oral intake  · Etiology: related to Difficulty swallowing     Signs and symptoms:  as evidenced by Swallow study results, Intake 0-25%(NPO status prior to swallow eval)    Objective Information:  · Wound Type: Stage II(covered by mepilex)  · Current Nutrition Therapies:  · Oral Diet Orders: Dysphagia 1 (Pureed), Nectar Thick   · Oral Diet intake: 1-25%  · Oral Nutrition Supplement (ONS) Orders: None  · ONS intake: Unable to assess  · Anthropometric Measures:  · Ht: 5' 11\" (180.3 cm)   · Current Body Wt: 200 lb (90.7 kg)  · Admission Body Wt: 200 lb (90.7 kg)  · Ideal Body Wt: 172 lb (78 kg), % Ideal Body 116.2%  · BMI Classification: BMI 25.0 - 29.9 Overweight    Nutrition Interventions:   Continue current diet  Continued Inpatient Monitoring    Nutrition Evaluation:   · Evaluation: Goals set   · Goals: Pt will tolerate diet advancement; consume at least 50% of meals    · Monitoring: Nutrition Progression, Meal Intake, Diet Tolerance, Skin Integrity, Weight, Pertinent Labs, Chewing/Swallowing      Electronically signed by Ronald Dunn on 4/1/19 at 10:29 AM    Contact Number: [phone]

## 2019-04-01 NOTE — PROGRESS NOTES
Occupational Therapy   Occupational Therapy Initial Assessment  Date: 2019   Patient Name: Keven Gtz  MRN: [id number]     : 1940    Date of Service: 2019    Discharge Recommendations:  Patient would benefit from continued therapy after discharge       Assessment   Performance deficits / Impairments: Decreased ADL status; Decreased functional mobility ; Decreased ROM; Decreased cognition;Decreased safe awareness  Assessment: The patient is significantly impaired for all ADL tasks but was able to awaken patient sufficiently to participate in structured low level therapeutic tasks. Do not feel spouse could care for patient at home. Treatment Diagnosis: Metabolic encephalopathy, multiple falls, alzheimers  REQUIRES OT FOLLOW UP: Yes  Activity Tolerance  Activity Tolerance: Treatment limited secondary to decreased cognition  Safety Devices  Safety Devices in place: Yes  Type of devices: Call light within reach; Left in bed;Bed alarm in place           Patient Diagnosis(es): The primary encounter diagnosis was Adult failure to thrive. Diagnoses of Impaired ambulation and Delirium were also pertinent to this visit. has a past medical history of Alzheimer's dementia, COPD with emphysema ([facility] [address] Hyperlipidemia, Hypertension, Osteoarthritis, Palliative care patient, and Sleep disturbance. has no past surgical history on file.     Treatment Diagnosis: Metabolic encephalopathy, multiple falls, alzheimers      Restrictions  Restrictions/Precautions  Restrictions/Precautions: Fall Risk    Subjective   General  Chart Reviewed: Yes  Patient assessed for rehabilitation services?: Yes  Family / Caregiver Present: Yes(spouse)  Pain Assessment  Pain Assessment: 0-10  Pain Level: 0  Oxygen Therapy  SpO2: 97 %  O2 Device: Nasal cannula  Social/Functional History  Social/Functional History  Lives With: Spouse  Type of Home: House  Home Layout: One level  Home Equipment: Wheelchair-manual, Lift chair, Rolling walker  ADL Assistance: Needs assistance  Ambulation Assistance: (non ambulatory)  Transfer Assistance: Needs assistance  Additional Comments: Moved to [place] a couple of months ago to be near r. \"The only thing he's been able to do is transfer bed/recliner/commode/wheelchair\". Most recently he was having maximal difficulty standing. He stays confused most of time but can also have some \"normal\" conversations. Objective   Vision: Within Functional Limits  Hearing: Exceptions to [facility]  Hearing Exceptions: Hard of hearing/hearing concerns    Orientation  Overall Orientation Status: Impaired  Orientation Level: Oriented to person;Disoriented to place; Disoriented to situation;Disoriented to time     Balance  Sitting Balance: Contact guard assistance  Standing Balance  Activity: Pulled the Leveda Araujo up to the patient but he elected not to attempt standing. Functional Mobility  Functional Mobility Comments: Placed patient in high montgomery's--which helped significantly with increasing alertness. ADL  Feeding: Dependent/Total  Grooming: Dependent/Total  UE Bathing: Maximum assistance  LE Bathing: Dependent/Total  UE Dressing: Maximum assistance  LE Dressing: Maximum assistance;Dependent/Total(supine)  Toileting: Dependent/Total        Bed mobility  Supine to Sit: Maximum assistance  Sit to Supine: Moderate assistance        Cognition  Cognition Comment: Sleeping but awakens to stim.   Confused but able to engage in some therapeutic activity                 LUE AROM (degrees)  LUE General AROM: Requires AAROM --proximal limitations but distally wfl  RUE AROM (degrees)  RUE General AROM: Requires AAROM --proximal limitations but distally wfl                  Tx initiated:  ADL activities , positioning (10 mins)     Plan   Plan  Times per week: 3-5x weekly  Current Treatment Recommendations: Functional Mobility Training, Cognitive Reorientation, Patient/Caregiver Education & Training, Equipment Evaluation, Education, & procurement, Safety Education & Training, Self-Care / ADL    G-Code     OutComes Score                                                  AM-PAC Score             Goals  Short term goals  Time Frame for Short term goals: 1-2 weeks  Short term goal 1: Patient will demo ability to feed himself with intermittent min A  Short term goal 2: Patient will demo sufficient cognition/mobility to transfer to chair with assist as needed  Short term goal 3: Patient will participate in greater than 50% of grooming and UB dressing after set up       Therapy Time   Individual Concurrent Group Co-treatment   Time In           Time Out           Minutes                   Karolina Moe OT Electronically signed by Karolina Moe OT on 4/1/2019 at 2:52 PM

## 2019-04-01 NOTE — PLAN OF CARE
Problem: Nutrition  Goal: Optimal nutrition therapy  Description  Nutrition Problem: Inadequate oral intake  Intervention: Food and/or Nutrient Delivery: Continue current diet  Nutritional Goals: Pt will tolerate diet advancement; consume at least 50% of meals     4/1/2019 1031 by Lovely Larios  Outcome: Ongoing

## 2019-04-01 NOTE — PROGRESS NOTES
Conference with speech therapy today. Family does not want feeding tubes placed and I agree. Patient has significant dementia and long-term prognosis is poor. Urinary retention-Milligan placed. Will start Flomax when tolerating oral. Conservative management per family request.    Discussed with  today. Patient will need skilled nursing facility upon discharge. Conference with family. All agree with placement. Should be medically stable for discharge to skilled nursing facility in 1-2 days. Await results of speech evaluation. ).        Johanna Rosenbaum MD  4/1/2019

## 2019-04-01 NOTE — CARE COORDINATION
ADAN met with Pt and daughter in the room to discuss DC planning options. Pt has been listed with [address].  Awaiting approval/denial.  Electronically signed by Rosaline Hatchet on 4/1/2019 at 8:30 AM

## 2019-04-01 NOTE — PROGRESS NOTES
Bipap ordered per protocol patient was using his own at home per his wife. Family forgot to bring it with them today.  Electronically signed by Shukri Wong RN on 3/31/2019 at 7:03 PM

## 2019-04-01 NOTE — CARE COORDINATION
Pt has been accepted at [facility] with the insurance. Admissions will notify SW once Pre-Cert has cleared.  Awaiting approval/denial.  [facility]   P  [phone] F  Electronically signed by Cole Robison on 4/1/2019 at 3:33 PM

## 2019-04-01 NOTE — PROGRESS NOTES
Palliative Care: Met with pt, wife, and daughter to initiate Palliative Care, pt's wife says pt had weakness, confusion, and could not help himself up and she could not lift him. Past Medical History:        Past Medical History:   Diagnosis Date    Alzheimer's dementia     COPD with emphysema ([facility] [address]     Hyperlipidemia     Hypertension     Osteoarthritis     Palliative care patient 04/01/2019    Sleep disturbance        Advance Directives:  Pt has LW but just moved to area, requested copy from wife. Pain/Other Symptoms:   Pt is not having pain. Activity:      Pt to return to activity as tolerated. Psychological/Spiritual:    Pt is a [employer]. Patient/Family Discussion:     Pt has one son, one daughter, and grandchildren. Plan:    Plans to be determined, wife mentioned pt may not be able to return home. Patients goal, what they hope for:    Goal is for pt to get better, and keep him comfortable without suffering. Provided spiritual care with sustaining presence, nurtured hope, and prayer. Pt expressed gratitude for spiritual care.           Electronically signed by Anatoly Ceja on 4/1/2019 at 11:19 AM

## 2019-04-02 LAB
HCT VFR BLD CALC: 43.3 % (ref 42–52)
HEMOGLOBIN: 14.2 G/DL (ref 14–18)
MCH RBC QN AUTO: 33.1 PG (ref 27–31)
MCHC RBC AUTO-ENTMCNC: 32.8 G/DL (ref 33–37)
MCV RBC AUTO: 100.9 FL (ref 80–94)
PDW BLD-RTO: 14.4 % (ref 11.5–14.5)
PLATELET # BLD: 218 K/UL (ref 130–400)
PMV BLD AUTO: 10.2 FL (ref 9.4–12.4)
RBC # BLD: 4.29 M/UL (ref 4.7–6.1)
WBC # BLD: 9.5 K/UL (ref 4.8–10.8)

## 2019-04-02 PROCEDURE — 51702 INSERT TEMP BLADDER CATH: CPT

## 2019-04-02 PROCEDURE — 6370000000 HC RX 637 (ALT 250 FOR IP): Performed by: FAMILY MEDICINE

## 2019-04-02 PROCEDURE — 2700000000 HC OXYGEN THERAPY PER DAY

## 2019-04-02 PROCEDURE — 2580000003 HC RX 258: Performed by: FAMILY MEDICINE

## 2019-04-02 PROCEDURE — 51798 US URINE CAPACITY MEASURE: CPT

## 2019-04-02 PROCEDURE — 94660 CPAP INITIATION&MGMT: CPT

## 2019-04-02 PROCEDURE — 85027 COMPLETE CBC AUTOMATED: CPT

## 2019-04-02 PROCEDURE — 1210000000 HC MED SURG R&B

## 2019-04-02 PROCEDURE — 6360000002 HC RX W HCPCS: Performed by: FAMILY MEDICINE

## 2019-04-02 PROCEDURE — 36415 COLL VENOUS BLD VENIPUNCTURE: CPT

## 2019-04-02 RX ORDER — LISINOPRIL 20 MG/1
20 TABLET ORAL DAILY
Status: DISCONTINUED | OUTPATIENT
Start: 2019-04-02 | End: 2019-04-03 | Stop reason: HOSPADM

## 2019-04-02 RX ADMIN — QUETIAPINE FUMARATE 50 MG: 50 TABLET, EXTENDED RELEASE ORAL at 21:54

## 2019-04-02 RX ADMIN — SODIUM CHLORIDE: 9 INJECTION, SOLUTION INTRAVENOUS at 15:56

## 2019-04-02 RX ADMIN — ENOXAPARIN SODIUM 40 MG: 40 INJECTION SUBCUTANEOUS at 18:13

## 2019-04-02 RX ADMIN — ATORVASTATIN CALCIUM 10 MG: 10 TABLET, FILM COATED ORAL at 09:21

## 2019-04-02 RX ADMIN — DIVALPROEX SODIUM 250 MG: 250 TABLET, DELAYED RELEASE ORAL at 09:21

## 2019-04-02 RX ADMIN — LISINOPRIL 20 MG: 20 TABLET ORAL at 09:21

## 2019-04-02 RX ADMIN — MEMANTINE 10 MG: 5 TABLET ORAL at 21:54

## 2019-04-02 RX ADMIN — RIVASTIGMINE TARTRATE 3 MG: 3 CAPSULE ORAL at 09:21

## 2019-04-02 RX ADMIN — ASPIRIN 325 MG ORAL TABLET 325 MG: 325 PILL ORAL at 09:21

## 2019-04-02 RX ADMIN — TAMSULOSIN HYDROCHLORIDE 0.4 MG: 0.4 CAPSULE ORAL at 09:21

## 2019-04-02 RX ADMIN — RIVASTIGMINE TARTRATE 3 MG: 3 CAPSULE ORAL at 21:54

## 2019-04-02 RX ADMIN — MEMANTINE 10 MG: 5 TABLET ORAL at 09:21

## 2019-04-02 RX ADMIN — ACETAMINOPHEN 650 MG: 325 TABLET, FILM COATED ORAL at 11:22

## 2019-04-02 RX ADMIN — DIVALPROEX SODIUM 250 MG: 250 TABLET, DELAYED RELEASE ORAL at 21:54

## 2019-04-02 RX ADMIN — TRAZODONE HYDROCHLORIDE 100 MG: 100 TABLET ORAL at 21:54

## 2019-04-02 ASSESSMENT — PAIN SCALES - GENERAL
PAINLEVEL_OUTOF10: 0
PAINLEVEL_OUTOF10: 2

## 2019-04-02 ASSESSMENT — ENCOUNTER SYMPTOMS: SHORTNESS OF BREATH: 0

## 2019-04-02 NOTE — PROGRESS NOTES
Ibeth Becekr is a 67 y.o. male patient. Patient has been accepted to [address] [place] [address] home and rehab-awaiting insurance precertification.         Current Facility-Administered Medications   Medication Dose Route Frequency Provider Last Rate Last Dose    tamsulosin (FLOMAX) capsule 0.4 mg  0.4 mg Oral Daily Leah Osman MD   0.4 mg at 04/01/19 8056    0.9 % sodium chloride infusion   Intravenous Continuous Martina Castellanos  mL/hr at 04/01/19 2202      aspirin tablet 325 mg  325 mg Oral Daily Leah Osman MD   325 mg at 04/01/19 2403    atorvastatin (LIPITOR) tablet 10 mg  10 mg Oral Daily Leah Osamn MD   10 mg at 04/01/19 9597    divalproex (DEPAKOTE) DR tablet 250 mg  250 mg Oral TID Leah Osman MD   250 mg at 04/01/19 2202    lisinopril (PRINIVIL;ZESTRIL) tablet 10 mg  10 mg Oral Daily Leah Osman MD   10 mg at 04/01/19 5406    memantine (NAMENDA) tablet 10 mg  10 mg Oral BID Leah Osman MD   10 mg at 04/01/19 2203    QUEtiapine (SEROQUEL XR) extended release tablet 50 mg  50 mg Oral Nightly Leah Osman MD   50 mg at 04/01/19 2203    rivastigmine (EXELON) capsule 3 mg  3 mg Oral BID Leah Osman MD   3 mg at 04/01/19 2203    traZODone (DESYREL) tablet 100 mg  100 mg Oral Nightly Leah Osman MD   100 mg at 04/01/19 2202    sodium chloride flush 0.9 % injection 10 mL  10 mL Intravenous 2 times per day Leah Osman MD   10 mL at 04/01/19 0911    sodium chloride flush 0.9 % injection 10 mL  10 mL Intravenous PRN Leah Osman MD        magnesium hydroxide (MILK OF MAGNESIA) 400 MG/5ML suspension 30 mL  30 mL Oral Daily PRN Leah Osman MD        ondansetron [facility]) injection 4 mg  4 mg Intravenous Q6H PRN Leah Osman MD   4 mg at 03/31/19 0015    enoxaparin (LOVENOX) injection 40 mg  40 mg Subcutaneous Q24H Leah Osman MD   40 mg at 04/01/19 1738    acetaminophen (TYLENOL) tablet 650 mg  650 mg Oral Q4H PRN Tutu Marshall Toni Washington MD        LORazepam (ATIVAN) injection 1 mg  1 mg Intravenous Q6H PRN Yadi Navarro MD   1 mg at 03/31/19 1102     No Known Allergies  Active Problems:    Encephalopathy, metabolic    Dehydration    Multiple falls    Late onset Alzheimer's disease without behavioral disturbance    Unstable gait    Essential hypertension    Pure hypercholesterolemia    Panlobular emphysema (HCC)    Metabolic encephalopathy    Palliative care patient  Resolved Problems:    * No resolved hospital problems. *    Blood pressure (!) 144/86, pulse 74, temperature 97.5 °F (36.4 °C), temperature source Temporal, resp. rate 16, height 5' 11\" (1.803 m), weight 200 lb (90.7 kg), SpO2 97 %. Subjective:  Symptoms:  Stable. He reports weakness and anxiety. No shortness of breath, chest pain or chest pressure. Diet:  Adequate intake. Activity level: Impaired due to weakness. Pain:  He reports no pain. Objective:  General Appearance:  Comfortable and well-appearing. Vital signs: (most recent): Blood pressure (!) 144/86, pulse 74, temperature 97.5 °F (36.4 °C), temperature source Temporal, resp. rate 16, height 5' 11\" (1.803 m), weight 200 lb (90.7 kg), SpO2 97 %. Vital signs are normal.    Output: Minimal urine output and producing stool. HEENT: Normal HEENT exam.    Lungs:  Normal effort and normal respiratory rate. Heart: Normal rate. Regular rhythm. Abdomen: Abdomen is soft. Extremities: Decreased range of motion. Pulses: Distal pulses are intact. Neurological: (Seems more alert today. ). Pupils:  Pupils are equal, round, and reactive to light. Skin:  Warm and dry. Assessment:    Condition: In stable condition. Improving. (Urinary retention/BPH-advanced age and multiple comorbidities. Treat in a conservative manner. Straight cath placed, Flomax started. Follow urine output. Voiding trials in future. Hypertension-adjust blood pressure medication.     Dysphagia-nectar thick

## 2019-04-03 VITALS
TEMPERATURE: 97.9 F | DIASTOLIC BLOOD PRESSURE: 95 MMHG | HEIGHT: 71 IN | OXYGEN SATURATION: 91 % | BODY MASS INDEX: 28 KG/M2 | HEART RATE: 77 BPM | SYSTOLIC BLOOD PRESSURE: 148 MMHG | WEIGHT: 200 LBS | RESPIRATION RATE: 16 BRPM

## 2019-04-03 LAB
HCT VFR BLD CALC: 40.7 % (ref 42–52)
HEMOGLOBIN: 13.7 G/DL (ref 14–18)
MCH RBC QN AUTO: 32.5 PG (ref 27–31)
MCHC RBC AUTO-ENTMCNC: 33.7 G/DL (ref 33–37)
MCV RBC AUTO: 96.7 FL (ref 80–94)
PDW BLD-RTO: 14 % (ref 11.5–14.5)
PLATELET # BLD: 233 K/UL (ref 130–400)
PMV BLD AUTO: 10.1 FL (ref 9.4–12.4)
RBC # BLD: 4.21 M/UL (ref 4.7–6.1)
WBC # BLD: 7.1 K/UL (ref 4.8–10.8)

## 2019-04-03 PROCEDURE — 6370000000 HC RX 637 (ALT 250 FOR IP): Performed by: FAMILY MEDICINE

## 2019-04-03 PROCEDURE — 94660 CPAP INITIATION&MGMT: CPT

## 2019-04-03 PROCEDURE — 97530 THERAPEUTIC ACTIVITIES: CPT

## 2019-04-03 PROCEDURE — 2700000000 HC OXYGEN THERAPY PER DAY

## 2019-04-03 PROCEDURE — 85027 COMPLETE CBC AUTOMATED: CPT

## 2019-04-03 PROCEDURE — 97161 PT EVAL LOW COMPLEX 20 MIN: CPT

## 2019-04-03 PROCEDURE — 36415 COLL VENOUS BLD VENIPUNCTURE: CPT

## 2019-04-03 PROCEDURE — 92526 ORAL FUNCTION THERAPY: CPT

## 2019-04-03 PROCEDURE — 2580000003 HC RX 258: Performed by: FAMILY MEDICINE

## 2019-04-03 RX ORDER — TAMSULOSIN HYDROCHLORIDE 0.4 MG/1
0.4 CAPSULE ORAL DAILY
Qty: 30 CAPSULE | Refills: 3 | Status: SHIPPED | OUTPATIENT
Start: 2019-04-03

## 2019-04-03 RX ADMIN — MEMANTINE 10 MG: 5 TABLET ORAL at 08:25

## 2019-04-03 RX ADMIN — LISINOPRIL 20 MG: 20 TABLET ORAL at 08:25

## 2019-04-03 RX ADMIN — DIVALPROEX SODIUM 250 MG: 250 TABLET, DELAYED RELEASE ORAL at 08:25

## 2019-04-03 RX ADMIN — TAMSULOSIN HYDROCHLORIDE 0.4 MG: 0.4 CAPSULE ORAL at 08:25

## 2019-04-03 RX ADMIN — SODIUM CHLORIDE: 9 INJECTION, SOLUTION INTRAVENOUS at 00:04

## 2019-04-03 RX ADMIN — ASPIRIN 325 MG ORAL TABLET 325 MG: 325 PILL ORAL at 08:24

## 2019-04-03 RX ADMIN — ATORVASTATIN CALCIUM 10 MG: 10 TABLET, FILM COATED ORAL at 08:25

## 2019-04-03 RX ADMIN — RIVASTIGMINE TARTRATE 3 MG: 3 CAPSULE ORAL at 08:25

## 2019-04-03 NOTE — PROGRESS NOTES
Physical Therapy    Facility/Department: [facility] SURG SERVICES  Initial Assessment    NAME: Josesito Rosales  : 1940  MRN: [id number]    Date of Service: 4/3/2019    Assessment   Body structures, Functions, Activity limitations: Decreased functional mobility ; Decreased cognition  Assessment: pt demonstrated increased difficulty with functional mobility and is at risk for falls and further functional decline. pt would benefit from skilled PT services to address deficits listed in evaluation to improve pt safety and level of care. Treatment Diagnosis: difficulty ambulating  REQUIRES PT FOLLOW UP: Yes  Activity Tolerance  Activity Tolerance: Patient Tolerated treatment well       Patient Diagnosis(es): The primary encounter diagnosis was Adult failure to thrive. Diagnoses of Impaired ambulation and Delirium were also pertinent to this visit. has a past medical history of Alzheimer's dementia, COPD with emphysema ([facility] [address] Hyperlipidemia, Hypertension, Osteoarthritis, Palliative care patient, and Sleep disturbance. has no past surgical history on file.     Restrictions  Restrictions/Precautions  Restrictions/Precautions: Fall Risk  Vision/Hearing  Vision: Within Functional Limits  Hearing Exceptions: Hard of hearing/hearing concerns     Subjective  General  Chart Reviewed: Yes  Patient assessed for rehabilitation services?: Yes  Family / Caregiver Present: Yes  Diagnosis: Metabolic encephalopathy  Follows Commands: Within Functional Limits  Subjective  Subjective: pt was willing to participate with movtivation from family  Pain Screening  Patient Currently in Pain: Denies  Vital Signs  Patient Currently in Pain: Denies       Orientation     Social/Functional History  Social/Functional History  Lives With: Spouse  Type of Home: House  Home Layout: One level  Home Equipment: Wheelchair-manual, Lift chair, Rolling walker  ADL Assistance: Needs assistance  Ambulation Assistance: (dtr states he hasn't ambulated in 6mo)  Transfer Assistance: Needs assistance  Additional Comments: Moved to [facility] a couple of months ago to be near r. \"The only thing he's been able to do is transfer bed/recliner/commode/wheelchair\". Most recently he was having maximal difficulty standing. He stays confused most of time but can also have some \"normal\" conversations. Cognition   Cognition  Overall Cognitive Status: Exceptions  Following Commands: Follows one step commands with increased time  Attention Span: Appears intact  Initiation: Requires cues for some  Sequencing: Requires cues for some    Objective          AROM RLE (degrees)  RLE AROM: WFL  AROM LLE (degrees)  LLE AROM : WFL  Strength RLE  Strength RLE: WFL  Strength LLE  Strength LLE: WFL        Bed mobility  Supine to Sit: Moderate assistance  Sit to Supine: Moderate assistance  Transfers  Sit to Stand: Minimal Assistance  Stand to sit: Contact guard assistance(cues for slow sitting)  Squat Pivot Transfers: Minimal Assistance  Comment: pt required Olegario with squat pivot to bedside chair; pt had BM with transfer and required Olegario to stand at RW while cleaned  Ambulation  Ambulation?: No     Balance  Posture: Fair  Sitting - Static: Fair  Standing - Static: Poor  Comments: pt stood for ~1min at RW while cleaned up requiring Olegario to maintain standing; pt unable to stand fully erect with multiple VC/TC        Plan   Plan  Times per week: 7  Times per day: Daily  Plan weeks: 2  Current Treatment Recommendations: Balance Training, Functional Mobility Training, Transfer Training, Safety Education & Training, Patient/Caregiver Education & Training  Safety Devices  Type of devices:  All fall risk precautions in place, Left in bed, Patient at risk for falls, Nurse notified, Gait belt, Call light within reach, Telesitter in use    Goals  Short term goals  Time Frame for Short term goals: 14 days  Short term goal 1: pt will improve supine<>sit to supervision  Short term goal 2: pt will improve sit<>stands to [facility]  Short term goal 3: pt will improve pivot transfers to [address] [place] [address]       Electronically signed by Blake Kidd on 4/3/2019 at 10:23 AM

## 2019-04-03 NOTE — PROGRESS NOTES
[facility] has been notified and will be en route shortly.  Electronically signed by Walter Smith RN on 4/3/2019 at 9:54 AM

## 2019-04-03 NOTE — PROGRESS NOTES
Report called to [facility].  ,Electronically signed by Walter Smith RN on 4/3/2019 at 9:49 AM

## 2019-04-03 NOTE — PROGRESS NOTES
Speech Language Pathology  Facility/Department: [facility] SURG SERVICES  SWALLOW THERAPY     NAME: Aaron Turner  : 1940  MRN: [id number]    ADMISSION DATE: 3/30/2019  ADMITTING DIAGNOSIS: has Encephalopathy, metabolic; Dehydration; Multiple falls; Late onset Alzheimer's disease without behavioral disturbance; Unstable gait; Essential hypertension; Pure hypercholesterolemia; Panlobular emphysema ([address] Metabolic encephalopathy; and Palliative care patient on their problem list.    Date of Treat: 4/3/2019  Evaluating Therapist: Masha Vasquez    Current Diet level:  Current Diet : Puree  Current Liquid Diet : Nectar    Reason for Referral  Aaron Turner was referred for a bedside swallow evaluation to assess the efficiency of his swallow function, identify signs and symptoms of aspiration and make recommendations regarding safe dietary consistencies, effective compensatory strategies, and safe eating environment. Impression  Observed patient's swallowing function. Patient exhibits slow, decreased oral prep and decreased oral transit of more solid consistencies (mechanical soft consistency residue was poorly cleared with additional swallows and liquid washes) as well as very sluggish laryngeal elevation for swallow airway protection. Even so, no outward S/S penetration/aspiration was noted with any puree consistency presentation, mechanical soft consistency trial, or nectar thick liquid presentation administered during treatment session this date. At this time, secondary to oral prep and throat movement during swallows, would recommend continuation current diet. Meds crushed in pudding or applesauce. Assist feed.     Treatment Plan  Requires SLP Intervention: No    Recommended Diet and Intervention  Diet Solids Recommendation: Dysphagia I Pureed  Liquid Consistency Recommendation: Nectar  Recommended Form of Meds: Crushed in puree as able    Compensatory Swallowing Strategies  Compensatory Swallowing Strategies: Upright as possible for all oral intake;Assist feed;Small bites/sips;Eat/Feed slowly; Alternate solids and liquids; Remain upright for 30-45 minutes after meals    General  Chart Reviewed: Yes  Behavior/Cognition: Alert; Cooperative  Follows Directions: Simple  Patient Positioning: Upright in bed  Consistencies Administered: Puree;Mechanical soft; Nectar - cup    Observed patient's swallowing function with the following observations noted:     Oral Phase Dysfunction  Oral Phase: Exceptions  Oral Phase Dysfunction  Impaired Mastication: Puree;Mechanical soft(Patient exhibited very slow oral prep with primarily vertical jaw movement noted at the front of the mouth during puree consistency presentations and mechanical soft consistency trials (with added gravy texture) presented by SLP. )  Decreased Anterior to Posterior Transit: Mechanical soft  Lingual/Palatal Residue: Mechanical soft(Significant oral cavity residue was noted post swallows; residue was poorly cleared with additional dry swallows and with nectar thick liquid washes. Residue was prompted to be expectorated from the mouth.)  Oral Phase - Comment: Oral transit of puree consistency presentations and mechanical soft consistency trials primarily measured 1-2 seconds in length. Oral transit of nectar thick liquid presentations, administered via cup by SLP, primarily measured 1 second in length. Indicators of Pharyngeal Phase Dysfunction  Pharyngeal Phase: Exceptions  Indicators of Pharyngeal Phase Dysfunction  Decreased Laryngeal Elevation: (Patient exhibited very sluggish laryngeal elevation for swallow airway protection.)  Pharyngeal: No outward S/S penetration/aspiration was noted with any puree consistency presentation, mechanical soft consistency trial, or nectar thick liquid presentation administered during treatment session this date.      At this time, secondary to oral prep and throat movement during swallows, would recommend continuation current diet. Meds crushed in pudding or applesauce. Assist feed. No further acute speech therapy is felt to be warranted as patient is scheduled to DC to SNF this date.      Electronically signed by NATALIA Perdue on 4/3/2019 at 10:17 AM

## 2019-04-03 NOTE — DISCHARGE SUMMARY
Hospital Discharge Summary    Mayela Wren  :  1940  MRN:  [id number]    Admit date:  3/30/2019  Discharge date:  4/3/2019    Admitting Physician:    Bret Barajas MD    Discharge Diagnoses: Active Problems:    Encephalopathy, metabolic    Dehydration    Multiple falls    Late onset Alzheimer's disease without behavioral disturbance    Unstable gait    Essential hypertension    Pure hypercholesterolemia    Panlobular emphysema (HCC)    Metabolic encephalopathy    Palliative care patient  Resolved Problems:    * No resolved hospital problems. [facility] Course: The patient was admitted for the above noted medical/surgical issues. Please see daily progress note for further details concerning their stay. The patient improved throughout their stay and reached maximum medical improvement on the day of discharge. The patient/family agree with the treatment plan as outlined above. All questions concerning their stay were answered prior to discharge. They understand the importance of follow up concerning any abnormal test results. Pleasant 21-year-old gentleman who had had progressive decline in functional capacity at home. He presented to the emergency room and was found to be in metabolic encephalopathy related to dehydration. He was gently rehydrated. Unfortunately he developed some urinary retention. A catheter was placed and he was started on Flomax. He also had some dysphasia and speech evaluated the patient and recommended nectar thickened feeds. After conference with patient's family and  a bed was found to [address] [place] [address] home and rehab and he'll be discharged there today. With his urinary retention we will give him Flomax for 7-10 days and then a voiding trial at the nursing home if unable to void then recommend urology evaluation. As far as his dysphagia continue with speech therapy at [facility].     He can follow-up in my office in 7-10 days after discharge home from [facility] and rehab. Discharge Medications:       Alyse Arellano   Home Medication Instructions EIT:[id number]    Printed on:04/03/19 1336   Medication Information                      aspirin 325 MG tablet  Take 325 mg by mouth daily             atorvastatin (LIPITOR) 10 MG tablet  Take 10 mg by mouth daily             divalproex (DEPAKOTE) 250 MG DR tablet  Take 250 mg by mouth 3 times daily             hydrochlorothiazide (HYDRODIURIL) 25 MG tablet  Take 25 mg by mouth daily             lisinopril (PRINIVIL;ZESTRIL) 10 MG tablet  Take 10 mg by mouth daily             Melatonin 10 MG CAPS  Take by mouth             memantine (NAMENDA) 10 MG tablet  Take 10 mg by mouth daily              QUEtiapine (SEROQUEL XR) 50 MG extended release tablet  Take 50 mg by mouth nightly             rivastigmine (EXELON) 3 MG capsule  Take 3 mg by mouth 2 times daily             tamsulosin (FLOMAX) 0.4 MG capsule  Take 1 capsule by mouth daily             traZODone (DESYREL) 100 MG tablet  Take 100 mg by mouth nightly                 Consults:  Speech therapy    Significant Diagnostic Studies:    See summary of labs/x-rays/path report for further details      Treatments:   IV fluid/Milligan catheter placement    Disposition:   [facility] and rehab  Follow up with Maryjane Saul MD in 7-10 days after discharge home from [facility].     Signed:  Maryjane Saul   4/3/2019, 7:28 AM

## 2019-04-04 LAB
BLOOD CULTURE, ROUTINE: NORMAL
CULTURE, BLOOD 2: NORMAL

## 2019-04-05 ENCOUNTER — LAB REQUISITION (OUTPATIENT)
Dept: LAB | Facility: HOSPITAL | Age: 79
End: 2019-04-05

## 2019-04-05 ENCOUNTER — HOSPITAL ENCOUNTER (INPATIENT)
Age: 79
LOS: 5 days | Discharge: NURSING, REHAB OR LONG-TERM CARE | DRG: 689 | End: 2019-04-10
Attending: EMERGENCY MEDICINE | Admitting: FAMILY MEDICINE
Payer: MEDICARE

## 2019-04-05 DIAGNOSIS — N39.0 URINARY TRACT INFECTION WITH HEMATURIA, SITE UNSPECIFIED: ICD-10-CM

## 2019-04-05 DIAGNOSIS — Z00.00 ENCOUNTER FOR GENERAL ADULT MEDICAL EXAMINATION WITHOUT ABNORMAL FINDINGS: ICD-10-CM

## 2019-04-05 DIAGNOSIS — G93.40 ENCEPHALOPATHY: Primary | ICD-10-CM

## 2019-04-05 DIAGNOSIS — E87.6 HYPOKALEMIA: ICD-10-CM

## 2019-04-05 DIAGNOSIS — R31.9 URINARY TRACT INFECTION WITH HEMATURIA, SITE UNSPECIFIED: ICD-10-CM

## 2019-04-05 LAB
25(OH)D3 SERPL-MCNC: <12.8 NG/ML (ref 30–100)
ALBUMIN SERPL-MCNC: 2.7 G/DL (ref 3.5–5)
ALBUMIN SERPL-MCNC: 3 G/DL (ref 3.5–5.2)
ALBUMIN/GLOB SERPL: 1.1 G/DL (ref 1.1–2.5)
ALP BLD-CCNC: 45 U/L (ref 40–130)
ALP SERPL-CCNC: 50 U/L (ref 24–120)
ALT SERPL W P-5'-P-CCNC: 35 U/L (ref 0–54)
ALT SERPL-CCNC: 26 U/L (ref 5–41)
AMPHETAMINE SCREEN, URINE: NEGATIVE
ANION GAP SERPL CALCULATED.3IONS-SCNC: 10 MMOL/L (ref 4–13)
ANION GAP SERPL CALCULATED.3IONS-SCNC: 12 MMOL/L (ref 7–19)
ARTICHOKE IGE QN: 57 MG/DL (ref 0–99)
AST SERPL-CCNC: 48 U/L (ref 5–40)
AST SERPL-CCNC: 51 U/L (ref 7–45)
BACTERIA: ABNORMAL /HPF
BARBITURATE SCREEN URINE: NEGATIVE
BASOPHILS # BLD AUTO: 0.04 10*3/MM3 (ref 0–0.2)
BASOPHILS ABSOLUTE: 0 K/UL (ref 0–0.2)
BASOPHILS NFR BLD AUTO: 0.5 % (ref 0–2)
BASOPHILS RELATIVE PERCENT: 0.4 % (ref 0–1)
BENZODIAZEPINE SCREEN, URINE: POSITIVE
BILIRUB SERPL-MCNC: 0.5 MG/DL (ref 0.2–1.2)
BILIRUB SERPL-MCNC: 0.6 MG/DL (ref 0.1–1)
BILIRUBIN URINE: ABNORMAL
BLOOD, URINE: ABNORMAL
BUN BLD-MCNC: 6 MG/DL (ref 5–21)
BUN BLDV-MCNC: 7 MG/DL (ref 8–23)
BUN/CREAT SERPL: 12.2 (ref 7–25)
CALCIUM SERPL-MCNC: 9.2 MG/DL (ref 8.8–10.2)
CALCIUM SPEC-SCNC: 9.1 MG/DL (ref 8.4–10.4)
CANNABINOID SCREEN URINE: NEGATIVE
CHLORIDE BLD-SCNC: 102 MMOL/L (ref 98–111)
CHLORIDE SERPL-SCNC: 99 MMOL/L (ref 98–110)
CHOLEST SERPL-MCNC: 100 MG/DL (ref 130–200)
CLARITY: ABNORMAL
CO2 SERPL-SCNC: 31 MMOL/L (ref 24–31)
CO2: 29 MMOL/L (ref 22–29)
COCAINE METABOLITE SCREEN URINE: NEGATIVE
COLOR: ABNORMAL
CREAT BLD-MCNC: 0.49 MG/DL (ref 0.5–1.4)
CREAT SERPL-MCNC: 0.5 MG/DL (ref 0.5–1.2)
DEPRECATED RDW RBC AUTO: 47.8 FL (ref 40–54)
EOSINOPHIL # BLD AUTO: 0.08 10*3/MM3 (ref 0–0.7)
EOSINOPHIL NFR BLD AUTO: 1 % (ref 0–4)
EOSINOPHILS ABSOLUTE: 0.1 K/UL (ref 0–0.6)
EOSINOPHILS RELATIVE PERCENT: 1 % (ref 0–5)
EPITHELIAL CELLS, UA: 0 /HPF (ref 0–5)
ERYTHROCYTE [DISTWIDTH] IN BLOOD BY AUTOMATED COUNT: 14 % (ref 12–15)
ETHANOL: <10 MG/DL (ref 0–0.08)
GFR NON-AFRICAN AMERICAN: >60
GFR SERPL CREATININE-BSD FRML MDRD: >150 ML/MIN/1.73
GFR SERPL CREATININE-BSD FRML MDRD: >150 ML/MIN/1.73
GLOBULIN UR ELPH-MCNC: 2.5 GM/DL
GLUCOSE BLD-MCNC: 115 MG/DL (ref 74–109)
GLUCOSE BLD-MCNC: 78 MG/DL (ref 70–100)
GLUCOSE URINE: NEGATIVE MG/DL
HBA1C MFR BLD: 5.4 %
HCT VFR BLD AUTO: 39.7 % (ref 40–52)
HCT VFR BLD CALC: 38.1 % (ref 42–52)
HDLC SERPL-MCNC: 37 MG/DL
HEMOGLOBIN: 13.3 G/DL (ref 14–18)
HGB BLD-MCNC: 13.9 G/DL (ref 14–18)
HYALINE CASTS: 10 /HPF (ref 0–8)
IMM GRANULOCYTES # BLD AUTO: 0.05 10*3/MM3 (ref 0–0.05)
IMM GRANULOCYTES NFR BLD AUTO: 0.6 % (ref 0–5)
KETONES, URINE: 15 MG/DL
LDLC/HDLC SERPL: 1.15 {RATIO}
LEUKOCYTE ESTERASE, URINE: ABNORMAL
LYMPHOCYTES # BLD AUTO: 1.52 10*3/MM3 (ref 0.72–4.86)
LYMPHOCYTES ABSOLUTE: 1.3 K/UL (ref 1.1–4.5)
LYMPHOCYTES NFR BLD AUTO: 19.6 % (ref 15–45)
LYMPHOCYTES RELATIVE PERCENT: 19 % (ref 20–40)
Lab: ABNORMAL
MAGNESIUM SERPL-MCNC: 1.7 MG/DL (ref 1.4–2.2)
MAGNESIUM: 1.7 MG/DL (ref 1.6–2.4)
MCH RBC QN AUTO: 32.2 PG (ref 28–32)
MCH RBC QN AUTO: 33.2 PG (ref 27–31)
MCHC RBC AUTO-ENTMCNC: 34.9 G/DL (ref 33–37)
MCHC RBC AUTO-ENTMCNC: 35 G/DL (ref 33–36)
MCV RBC AUTO: 91.9 FL (ref 82–95)
MCV RBC AUTO: 95 FL (ref 80–94)
MONOCYTES # BLD AUTO: 1.35 10*3/MM3 (ref 0.19–1.3)
MONOCYTES ABSOLUTE: 1.3 K/UL (ref 0–0.9)
MONOCYTES NFR BLD AUTO: 17.4 % (ref 4–12)
MONOCYTES RELATIVE PERCENT: 18.4 % (ref 0–10)
NEUTROPHILS # BLD AUTO: 4.73 10*3/MM3 (ref 1.87–8.4)
NEUTROPHILS ABSOLUTE: 4.2 K/UL (ref 1.5–7.5)
NEUTROPHILS NFR BLD AUTO: 60.9 % (ref 39–78)
NEUTROPHILS RELATIVE PERCENT: 60.8 % (ref 50–65)
NITRITE, URINE: NEGATIVE
NRBC BLD AUTO-RTO: 0 /100 WBC (ref 0–0)
OPIATE SCREEN URINE: NEGATIVE
PDW BLD-RTO: 13.8 % (ref 11.5–14.5)
PH UA: 7.5 (ref 5–8)
PLATELET # BLD AUTO: 226 10*3/MM3 (ref 130–400)
PLATELET # BLD: 237 K/UL (ref 130–400)
PMV BLD AUTO: 10.8 FL (ref 6–12)
PMV BLD AUTO: 9.7 FL (ref 9.4–12.4)
POTASSIUM BLD-SCNC: 2.4 MMOL/L (ref 3.5–5.3)
POTASSIUM REFLEX MAGNESIUM: 2.4 MMOL/L (ref 3.5–5)
POTASSIUM SERPL-SCNC: 3.2 MMOL/L (ref 3.5–5)
PREALB SERPL-MCNC: 8.4 MG/DL (ref 18–36)
PROT SERPL-MCNC: 5.2 G/DL (ref 6.3–8.7)
PROTEIN UA: 30 MG/DL
RBC # BLD AUTO: 4.32 10*6/MM3 (ref 4.8–5.9)
RBC # BLD: 4.01 M/UL (ref 4.7–6.1)
RBC UA: 78 /HPF (ref 0–4)
SODIUM BLD-SCNC: 140 MMOL/L (ref 135–145)
SODIUM BLD-SCNC: 143 MMOL/L (ref 136–145)
SPECIFIC GRAVITY UA: 1.01 (ref 1–1.03)
T4 FREE SERPL-MCNC: 2.03 NG/DL (ref 0.78–2.19)
TOTAL PROTEIN: 5.9 G/DL (ref 6.6–8.7)
TRIGL SERPL-MCNC: 102 MG/DL (ref 0–149)
TSH SERPL DL<=0.05 MIU/L-ACNC: 2.18 MIU/ML (ref 0.47–4.68)
URINE REFLEX TO CULTURE: YES
UROBILINOGEN, URINE: 4 E.U./DL
VALPROATE SERPL-MCNC: 43.6 MCG/ML (ref 50–100)
VALPROIC ACID LEVEL: 48.4 UG/ML (ref 50–100)
VIT B12 BLD-MCNC: 846 PG/ML (ref 239–931)
WBC # BLD: 7 K/UL (ref 4.8–10.8)
WBC NRBC COR # BLD: 7.77 10*3/MM3 (ref 4.8–10.8)
WBC UA: 31 /HPF (ref 0–5)

## 2019-04-05 PROCEDURE — 83036 HEMOGLOBIN GLYCOSYLATED A1C: CPT | Performed by: INTERNAL MEDICINE

## 2019-04-05 PROCEDURE — 87086 URINE CULTURE/COLONY COUNT: CPT

## 2019-04-05 PROCEDURE — 84134 ASSAY OF PREALBUMIN: CPT | Performed by: INTERNAL MEDICINE

## 2019-04-05 PROCEDURE — 85025 COMPLETE CBC W/AUTO DIFF WBC: CPT | Performed by: INTERNAL MEDICINE

## 2019-04-05 PROCEDURE — 6370000000 HC RX 637 (ALT 250 FOR IP): Performed by: EMERGENCY MEDICINE

## 2019-04-05 PROCEDURE — 80164 ASSAY DIPROPYLACETIC ACD TOT: CPT

## 2019-04-05 PROCEDURE — 93005 ELECTROCARDIOGRAM TRACING: CPT

## 2019-04-05 PROCEDURE — 81001 URINALYSIS AUTO W/SCOPE: CPT

## 2019-04-05 PROCEDURE — 99285 EMERGENCY DEPT VISIT HI MDM: CPT

## 2019-04-05 PROCEDURE — 99285 EMERGENCY DEPT VISIT HI MDM: CPT | Performed by: EMERGENCY MEDICINE

## 2019-04-05 PROCEDURE — 87186 SC STD MICRODIL/AGAR DIL: CPT

## 2019-04-05 PROCEDURE — 85025 COMPLETE CBC W/AUTO DIFF WBC: CPT

## 2019-04-05 PROCEDURE — 84132 ASSAY OF SERUM POTASSIUM: CPT

## 2019-04-05 PROCEDURE — 80053 COMPREHEN METABOLIC PANEL: CPT | Performed by: INTERNAL MEDICINE

## 2019-04-05 PROCEDURE — 84439 ASSAY OF FREE THYROXINE: CPT | Performed by: INTERNAL MEDICINE

## 2019-04-05 PROCEDURE — 82607 VITAMIN B-12: CPT | Performed by: INTERNAL MEDICINE

## 2019-04-05 PROCEDURE — 80053 COMPREHEN METABOLIC PANEL: CPT

## 2019-04-05 PROCEDURE — 87077 CULTURE AEROBIC IDENTIFY: CPT

## 2019-04-05 PROCEDURE — 83735 ASSAY OF MAGNESIUM: CPT

## 2019-04-05 PROCEDURE — 80307 DRUG TEST PRSMV CHEM ANLYZR: CPT

## 2019-04-05 PROCEDURE — 80164 ASSAY DIPROPYLACETIC ACD TOT: CPT | Performed by: INTERNAL MEDICINE

## 2019-04-05 PROCEDURE — 83735 ASSAY OF MAGNESIUM: CPT | Performed by: INTERNAL MEDICINE

## 2019-04-05 PROCEDURE — 82306 VITAMIN D 25 HYDROXY: CPT | Performed by: INTERNAL MEDICINE

## 2019-04-05 PROCEDURE — 1210000000 HC MED SURG R&B

## 2019-04-05 PROCEDURE — G0480 DRUG TEST DEF 1-7 CLASSES: HCPCS

## 2019-04-05 PROCEDURE — 36415 COLL VENOUS BLD VENIPUNCTURE: CPT | Performed by: INTERNAL MEDICINE

## 2019-04-05 PROCEDURE — 84443 ASSAY THYROID STIM HORMONE: CPT | Performed by: INTERNAL MEDICINE

## 2019-04-05 PROCEDURE — 36415 COLL VENOUS BLD VENIPUNCTURE: CPT

## 2019-04-05 PROCEDURE — 80061 LIPID PANEL: CPT | Performed by: INTERNAL MEDICINE

## 2019-04-05 RX ORDER — POTASSIUM CHLORIDE 20 MEQ/1
40 TABLET, EXTENDED RELEASE ORAL ONCE
Status: COMPLETED | OUTPATIENT
Start: 2019-04-05 | End: 2019-04-05

## 2019-04-05 RX ORDER — MAGNESIUM SULFATE IN WATER 40 MG/ML
2 INJECTION, SOLUTION INTRAVENOUS ONCE
Status: COMPLETED | OUTPATIENT
Start: 2019-04-05 | End: 2019-04-06

## 2019-04-05 RX ORDER — NITROFURANTOIN 25; 75 MG/1; MG/1
100 CAPSULE ORAL ONCE
Status: COMPLETED | OUTPATIENT
Start: 2019-04-05 | End: 2019-04-05

## 2019-04-05 RX ADMIN — POTASSIUM CHLORIDE 40 MEQ: 20 TABLET, EXTENDED RELEASE ORAL at 18:49

## 2019-04-05 RX ADMIN — NITROFURANTOIN MONOHYDRATE/MACROCRYSTALLINE 100 MG: 25; 75 CAPSULE ORAL at 18:49

## 2019-04-05 NOTE — BH NOTE
Psychiatry Initial Intake    4/5/19    Fidel Dean ,a 67 y.o. male, presents to the ED for a psychiatric assessment. Admit or Discharge:   Admit Diagnosis or Reason(s) for Discharge:   MRN # [id number]  ED Arrival time: 26  ED physician: [address]  MORENA Notification time: 1930 (advised ER that I would be there as soon as Odalis Field Nurse returned from break)  [phone] Delee RN called and stated Dr. Cruz [address] wanted me to come now, if possible, to assess pt as he becoming increasingly agitated. MORENA Assess time:   Psychiatrist call time:   Spoke with Dr. Arthur Kendrick    Patient is referred by: EMS brought patient in from [facility]    Reason for visit to ED - Presenting problem:     PT states reason for ED visit,   Pt exhibited aggressive behavior towards staff today. Pt tells me he doesn't know why he is here and he thinks his wife brought him. He said she is hiding and they keep moving her around so he can't find her. Spoke with pt's daughter, Daniela Keys, she stated they had a meeting with [facility] this morning and were under the impression they were working with [facility] and patient was going to go there. Daughter states within about 2 hours they called and said he was in an ambulance enroute to [facility] for aggressive behaviors, striking staff. Pt's daughter states her father and mother moved here from [facility] 2 months ago to be closer to her. Daughter states his health has been declining, but didn't realize how much. Elige Apley states her mother has been doing everything for him but she wasn't aware how in-depth it was. Elige Apley states her mother moves him from the bed to the bathroom to the chair. He isn't able to walk, incontinent of bladder and bowels, dysphasia and exhibits aggressive behaviors.   Daughter states her father has suffered with some depression in life but no SI or attempts, no psychiatric inpatient treatment, past hx of alcohol abuse but nothing severe in 20-30 years. Duration of symptoms: Worsening over last few months. Had to be brought to hospital on Saturday. Admitted 3/30/19 for Failure to Thrive, d/c'd to [facility], was there for 2-3 days before they sent him here. Current Stressors:     SI:  denies   Plan:    If yes describe:   Past SI attempts: no   If yes describe: How many: 0  Dates or Ages:   Currently able to contract for safety outside hospital:    Describe:     C-SSRS Completed:  Yes. Nothing reported per patient or family    HI: denies  If yes describe:   Delusions: denies  If yes describe:   Hallucinations: denies   If yes describe:   Risk of Harm to self: yes  If yes explain: demented  Was it within the past 6 months: no   Risk of Harm to others: yes   If yes explain: aggressive behaviors at times   Was it within the past 6 months: no   Anxiety 1-10:    Explain if increased:   Depression 1-10:    Explain if increased:   Risk taking behaviors: If yes explain:  Impulsive Behaviors/Control:yes  Level of function outside hospital decreased: yes   If yes explain: Not functioning  What is your living arrangements: Home with wife until he was d/c'd from hospital to SNF a few days ago  Who lives in the residence: He and wife, David Phelps. History of Psychiatric Treatment:     Previous Outpatient therapy: No  If yes where & when:   Are you compliant with appointments:   Psychiatric Hospitalizations:  No  Where & When:   Previous Diagnosis:    Previous psychiatric medications: Yes   If yes list examples: Seroquel, Depakote, Trazadone, Namenda  Are you compliant with medications: Yes     Violence and Trauma History:     History of violence by patient: yes   If yes explain: at SNF  History of Trauma: If yes explain:   History of Abuse: If yes explain/by whom:     Family History:  No family history on file. Family history of mental illness:    Family member & Diagnosis:    Family members with suicide attempt:     If yes explain: Family members who completed suicide: If yes explain:       Substance Abuse History:     SBIRT Completed: yes    Current ETOH LEVELS: <10    ETOH Abuse: Pas hx approx 20-30 years ago  Age of first drink:     Date of last drink:   Amount drinking daily:    Years of use:    Longest period of sobriety:   ETOH treatment history:    If yes describe:   History of seizures, blackouts, etc. due to ETOH abuse:    Family history of ETOH abuse:    Legal consequences of chemical use:      Substance/Chemical Abuse/Recreational Drug History: Denies  Age of first substance use:    Substance used:   Date of last substance use:    Substance treatment history:   Family history of substance abuse:     Tobacco use:   Opiates: It was discussed with pt they would not be receiving opiates unless they were within 3 days post surgery/acute injury. Patient voiced understanding and agreed.        Psychiatric Review Of Systems:     Recent Sleep changes: yes   Average hours per night:   With sleep aid: yes   Restful sleep: no   Difficulty falling asleep: yes   Difficulty staying asleep: yes   Difficulty awakening: no     Recent appetite changes: yes   Recent weight changes/Pounds gained (+) or lost (-):         Energy level changes:  decreased   Interest/pleasure/anhedonia:  yes     Medical History:     Medical Diagnosis/Issues:   CT today in ED:no  Use of 02 or CPAP: no  Ambulatory: no  Independent Self Care: no  Use of OTC: no   Somatic symptoms:      PCP: Leah Osman MD     Current Medications:   Scheduled Meds:   Current Facility-Administered Medications:     potassium chloride (KLOR-CON M) extended release tablet 40 mEq, 40 mEq, Oral, Once, Cipriano Knowles MD    Current Outpatient Medications:     tamsulosin (FLOMAX) 0.4 MG capsule, Take 1 capsule by mouth daily, Disp: 30 capsule, Rfl: 3    lisinopril (PRINIVIL;ZESTRIL) 10 MG tablet, Take 10 mg by mouth daily, Disp: , Rfl:     aspirin 325 MG tablet, Take 325 mg by mouth information provided:      Checklist for [facility] AN AFFILIATE OF [facility] staff:   Legal signed:    Admission completed except as noted: Insurance Precert:       Safia Charles RN

## 2019-04-05 NOTE — ED NOTES
Janneth Yap from [facility] called and stated that if the patient is discharged today that he will NOT be going back to [facility], he will be going to [facility].  She called @ [address] [place] [address]  04/05/19 1311

## 2019-04-05 NOTE — ED NOTES
Pt has started yelling, held fist up at staff stating \"if someone does not bring tristin in here im going to blow this place up\"     Amita Campos RN  04/05/19 0064

## 2019-04-05 NOTE — ED PROVIDER NOTES
[facility] 4 ONCOLOGY UNIT  eMERGENCY dEPARTMENT eNCOUnter      Pt Name: Aries Stallings  MRN: [id number]  [place] 1940  Date of evaluation: 4/5/2019  Provider: Gianni Valentin MD    [address] [place] [address]       Chief Complaint   Patient presents with    Aggressive Behavior         HISTORY OF PRESENT ILLNESS   (Location/Symptom, Timing/Onset,Context/Setting, Quality, Duration, Modifying Factors, Severity)  Note limiting factors. Aries Stallings is a 67 y.o. male who presents to the emergency department      The history is provided by the EMS personnel. History limited by: pt sedated before arrival.   Mental Health Problem   Presenting symptoms: aggressive behavior, homicidal ideas (homicidal threats to [facility] staff) and suicidal threats    Patient accompanied by: no one. Degree of incapacity (severity):  Severe (reportedly)  Onset quality:  Unable to specify  Context comment:  Was discharged to [facility] from \"failure to thrive\" hospitalization 3/30  Treatment compliance: All of the time  Relieved by:  Nothing  Exacerbated by: ? staff interactions. Ineffective treatments:  Antidepressants, antipsychotics and mood stabilizers  Risk factors: hx of mental illness (dementia)        NursingNotes were reviewed. REVIEW OF SYSTEMS    (2-9 systems for level 4, 10 or more for level 5)     Review of Systems   Psychiatric/Behavioral: Positive for homicidal ideas (homicidal threats to [facility] staff). All other systems reviewed and are negative. Except as noted above the remainder of the review of systems was reviewed and negative. PAST MEDICAL HISTORY     Past Medical History:   Diagnosis Date    Alzheimer's dementia     COPD with emphysema ([facility] [address]     Hyperlipidemia     Hypertension     Osteoarthritis     Palliative care patient 04/01/2019    Sleep disturbance          SURGICALHISTORY     History reviewed. No pertinent surgical history.       CURRENT MEDICATIONS       Current Discharge Medication List CONTINUE these medications which have NOT CHANGED    Details   tamsulosin (FLOMAX) 0.4 MG capsule Take 1 capsule by mouth daily  Qty: 30 capsule, Refills: 3      lisinopril (PRINIVIL;ZESTRIL) 10 MG tablet Take 10 mg by mouth daily      aspirin 325 MG tablet Take 325 mg by mouth daily      hydrochlorothiazide (HYDRODIURIL) 25 MG tablet Take 25 mg by mouth daily      atorvastatin (LIPITOR) 10 MG tablet Take 10 mg by mouth daily      QUEtiapine (SEROQUEL XR) 50 MG extended release tablet Take 50 mg by mouth nightly      divalproex (DEPAKOTE) 250 MG DR tablet Take 250 mg by mouth 3 times daily      Melatonin 10 MG CAPS Take by mouth      memantine (NAMENDA) 10 MG tablet Take 10 mg by mouth daily       traZODone (DESYREL) 100 MG tablet Take 100 mg by mouth nightly      rivastigmine (EXELON) 3 MG capsule Take 3 mg by mouth 2 times daily             ALLERGIES     Patient has no known allergies. FAMILY HISTORY     History reviewed. No pertinent family history. SOCIAL HISTORY       Social History     Socioeconomic History    Marital status:      Spouse name: None    Number of children: None    Years of education: None    Highest education level: None   Occupational History    None   Social Needs    Financial resource strain: None    Food insecurity:     Worry: None     Inability: None    Transportation needs:     Medical: None     Non-medical: None   Tobacco Use    Smoking status: Current Every Day Smoker     Types: Cigarettes     Start date: 1954    Smokeless tobacco: Never Used   Substance and Sexual Activity    Alcohol use:  Yes     Alcohol/week: 2.4 oz     Types: 4 Glasses of wine per week     Frequency: Monthly or less     Drinks per session: 1 or 2     Binge frequency: Never    Drug use: Never    Sexual activity: Not Currently   Lifestyle    Physical activity:     Days per week: None     Minutes per session: None    Stress: None   Relationships    Social connections:     Talks on phone: None     Gets together: None     Attends Rastafarian service: None     Active member of club or organization: None     Attends meetings of clubs or organizations: None     Relationship status: None    Intimate partner violence:     Fear of current or ex partner: None     Emotionally abused: None     Physically abused: None     Forced sexual activity: None   Other Topics Concern    None   Social History Narrative    None       SCREENINGS    Shea Coma Scale  Eye Opening: Spontaneous  Best Verbal Response: Confused  Best Motor Response: Obeys commands  Shea Coma Scale Score: 14 @FLOW([id number])@      PHYSICAL EXAM    (up to 7 for level 4, 8 or more for level 5)     ED Triage Vitals [04/05/19 1330]   BP Temp Temp src Pulse Resp SpO2 Height Weight   -- 98.7 °F (37.1 °C) -- 85 18 95 % 5' 11\" (1.803 m) 200 lb (90.7 kg)       Physical Exam   Constitutional: He appears well-developed and well-nourished. No distress. Sleeping, sedated with Versed pta   HENT:   Mouth/Throat: Oropharynx is clear and moist.   Eyes: Conjunctivae are normal.   Neck: Normal range of motion. Neck supple. Cardiovascular: Normal rate, regular rhythm and normal heart sounds. Pulmonary/Chest: Effort normal and breath sounds normal.   Musculoskeletal: He exhibits no edema. Neurological:   Deferred until awake   Skin: Skin is warm and dry. He is not diaphoretic. Psychiatric:   Deferred until awake   Nursing note and vitals reviewed.       DIAGNOSTIC RESULTS     EKG: All EKG's are interpreted by the Emergency Department Physician who either signs or Co-signsthis chart in the absence of a cardiologist.        RADIOLOGY:   Non-plain filmimages such as CT, Ultrasound and MRI are read by the radiologist. Plain radiographic images are visualized and preliminarily interpreted by the emergency physician with the below findings:        Interpretation per the Radiologist below, if available at the time ofthis note:    No orders to display ED BEDSIDE ULTRASOUND:   Performed by ED Physician - none    LABS:  Labs Reviewed   CBC WITH AUTO DIFFERENTIAL - Abnormal; Notable for the following components:       Result Value    RBC 4.01 (*)     Hemoglobin 13.3 (*)     Hematocrit 38.1 (*)     MCV 95.0 (*)     MCH 33.2 (*)     Lymphocytes % 19.0 (*)     Monocytes % 18.4 (*)     Monocytes # 1.30 (*)     All other components within normal limits   COMPREHENSIVE METABOLIC PANEL W/ REFLEX TO MG FOR LOW K - Abnormal; Notable for the following components:    Potassium reflex Magnesium 2.4 (*)     Glucose 115 (*)     BUN 7 (*)     Total Protein 5.9 (*)     Alb 3.0 (*)     AST 48 (*)     All other components within normal limits    Narrative:     CALL  To  [facility] tel. ,  Chemistry results called to and read back by [facility] RN AT ED, 04/05/2019  14:22, by GIOVANNI   URINE RT REFLEX TO CULTURE - Abnormal; Notable for the following components:    Clarity, UA CLOUDY (*)     Bilirubin Urine SMALL (*)     Ketones, Urine 15 (*)     Blood, Urine MODERATE (*)     Protein, UA 30 (*)     Urobilinogen, Urine 4.0 (*)     Leukocyte Esterase, Urine TRACE (*)     All other components within normal limits   URINE DRUG SCREEN - Abnormal; Notable for the following components:    Benzodiazepine Screen, Urine Positive (*)     All other components within normal limits   VALPROIC ACID LEVEL, TOTAL - Abnormal; Notable for the following components:    Valproic Acid Lvl 48.4 (*)     All other components within normal limits   POTASSIUM - Abnormal; Notable for the following components:    Potassium 3.2 (*)     All other components within normal limits   MICROSCOPIC URINALYSIS - Abnormal; Notable for the following components:    Hyaline Casts, UA 10 (*)     WBC, UA 31 (*)     RBC, UA 78 (*)     All other components within normal limits   URINE CULTURE    Narrative:     ORDER#: [id number]                          ORDERED BY: KWADWO CLANCY  SOURCE: Urine Clean Catch COLLECTED:  04/05/19 16:05  ANTIBIOTICS AT IAN.:                      RECEIVED :  04/05/19 16:08   ETHANOL   MAGNESIUM    Narrative:     CALL  To  [facility] tel. ,  Chemistry results called to and read back by [facility] RN AT ED, 04/05/2019  14:22, by GIOVANNI       All other labs were within normal range or not returned as of this dictation. EMERGENCY DEPARTMENT COURSE and DIFFERENTIAL DIAGNOSIS/MDM:   Vitals:    Vitals:    04/05/19 2007 04/06/19 0216 04/06/19 0217 04/06/19 0730   BP: 131/74 131/74 131/74    Pulse: 89 89 89 94   Resp: 16   16   Temp:    98.9 °F (37.2 °C)   TempSrc:    Temporal   SpO2: 96%   90%   Weight:       Height:               MDM  Number of Diagnoses or Management Options  Encephalopathy:   Hypokalemia:   Urinary tract infection with hematuria, site unspecified:   Diagnosis management comments: On my exam when pt awake, pt argumentative and not making much sense. Denies events reported this am.  Somewhat aggressive with staff. Discussed with Dr. Dominique Cardona who felt pt qualified for medical admission. Endorsed to her. CRITICAL CARE TIME   Total Critical Care time was 0 minutes, excluding separately reportable procedures. There was a high probability of clinically significant/lifethreatening deterioration in the patient's condition which required my urgent intervention. CONSULTS:  IP CONSULT TO CASE MANAGEMENT    PROCEDURES:  Unless otherwise noted below, none     Procedures    FINAL IMPRESSION      1. Encephalopathy    2. Urinary tract infection with hematuria, site unspecified    3.  Hypokalemia          DISPOSITION/PLAN   DISPOSITION        PATIENT REFERRED TO:  David Ragland MD  [phone]            DISCHARGE MEDICATIONS:  Current Discharge Medication List             (Please note that portions of this note were completed with a voice recognition program.  Efforts were made to edit the dictations but occasionally words are mis-transcribed.)    Christopher Duncan MD (electronically signed)  Attending Emergency Physician          Christopher Duncan MD  04/06/19 1024

## 2019-04-05 NOTE — ED NOTES
Patient family member called and stated patient was no longer accepted at [facility] and could not go back to [place].  Dr. Kendra Mai and clinical house was made aware     Izzy Argueta RN  04/05/19 3933

## 2019-04-06 PROCEDURE — 6360000002 HC RX W HCPCS: Performed by: FAMILY MEDICINE

## 2019-04-06 PROCEDURE — 6370000000 HC RX 637 (ALT 250 FOR IP): Performed by: EMERGENCY MEDICINE

## 2019-04-06 PROCEDURE — 2580000003 HC RX 258: Performed by: EMERGENCY MEDICINE

## 2019-04-06 PROCEDURE — 1210000000 HC MED SURG R&B

## 2019-04-06 PROCEDURE — 6370000000 HC RX 637 (ALT 250 FOR IP): Performed by: FAMILY MEDICINE

## 2019-04-06 PROCEDURE — 2580000003 HC RX 258: Performed by: FAMILY MEDICINE

## 2019-04-06 PROCEDURE — 6360000002 HC RX W HCPCS: Performed by: EMERGENCY MEDICINE

## 2019-04-06 PROCEDURE — 96365 THER/PROPH/DIAG IV INF INIT: CPT

## 2019-04-06 RX ORDER — HYDROCHLOROTHIAZIDE 25 MG/1
25 TABLET ORAL DAILY
Status: DISCONTINUED | OUTPATIENT
Start: 2019-04-06 | End: 2019-04-10 | Stop reason: HOSPADM

## 2019-04-06 RX ORDER — UREA 10 %
2 LOTION (ML) TOPICAL NIGHTLY PRN
Status: DISCONTINUED | OUTPATIENT
Start: 2019-04-06 | End: 2019-04-10 | Stop reason: HOSPADM

## 2019-04-06 RX ORDER — ASPIRIN 325 MG
325 TABLET ORAL DAILY
Status: DISCONTINUED | OUTPATIENT
Start: 2019-04-06 | End: 2019-04-10 | Stop reason: HOSPADM

## 2019-04-06 RX ORDER — MEMANTINE HYDROCHLORIDE 5 MG/1
10 TABLET ORAL DAILY
Status: DISCONTINUED | OUTPATIENT
Start: 2019-04-06 | End: 2019-04-10 | Stop reason: HOSPADM

## 2019-04-06 RX ORDER — DIVALPROEX SODIUM 250 MG/1
250 TABLET, DELAYED RELEASE ORAL 3 TIMES DAILY
Status: DISCONTINUED | OUTPATIENT
Start: 2019-04-06 | End: 2019-04-10 | Stop reason: HOSPADM

## 2019-04-06 RX ORDER — SODIUM CHLORIDE 0.9 % (FLUSH) 0.9 %
10 SYRINGE (ML) INJECTION PRN
Status: DISCONTINUED | OUTPATIENT
Start: 2019-04-06 | End: 2019-04-10 | Stop reason: HOSPADM

## 2019-04-06 RX ORDER — HYDROCODONE BITARTRATE AND ACETAMINOPHEN 5; 325 MG/1; MG/1
2 TABLET ORAL ONCE
Status: COMPLETED | OUTPATIENT
Start: 2019-04-06 | End: 2019-04-06

## 2019-04-06 RX ORDER — QUETIAPINE FUMARATE 25 MG/1
50 TABLET, FILM COATED ORAL 2 TIMES DAILY
Status: DISCONTINUED | OUTPATIENT
Start: 2019-04-06 | End: 2019-04-08

## 2019-04-06 RX ORDER — RIVASTIGMINE TARTRATE 3 MG/1
3 CAPSULE ORAL 2 TIMES DAILY
Status: DISCONTINUED | OUTPATIENT
Start: 2019-04-06 | End: 2019-04-10 | Stop reason: HOSPADM

## 2019-04-06 RX ORDER — LORAZEPAM 2 MG/ML
0.25 INJECTION INTRAMUSCULAR EVERY 6 HOURS PRN
Status: DISCONTINUED | OUTPATIENT
Start: 2019-04-06 | End: 2019-04-10 | Stop reason: HOSPADM

## 2019-04-06 RX ORDER — SODIUM CHLORIDE 0.9 % (FLUSH) 0.9 %
10 SYRINGE (ML) INJECTION EVERY 12 HOURS SCHEDULED
Status: DISCONTINUED | OUTPATIENT
Start: 2019-04-06 | End: 2019-04-10 | Stop reason: HOSPADM

## 2019-04-06 RX ORDER — TAMSULOSIN HYDROCHLORIDE 0.4 MG/1
0.4 CAPSULE ORAL DAILY
Status: DISCONTINUED | OUTPATIENT
Start: 2019-04-06 | End: 2019-04-10 | Stop reason: HOSPADM

## 2019-04-06 RX ORDER — TRAZODONE HYDROCHLORIDE 50 MG/1
50 TABLET ORAL NIGHTLY
Status: DISCONTINUED | OUTPATIENT
Start: 2019-04-06 | End: 2019-04-08

## 2019-04-06 RX ORDER — ACETAMINOPHEN 325 MG/1
650 TABLET ORAL EVERY 4 HOURS PRN
Status: DISCONTINUED | OUTPATIENT
Start: 2019-04-06 | End: 2019-04-10 | Stop reason: HOSPADM

## 2019-04-06 RX ORDER — ATORVASTATIN CALCIUM 20 MG/1
10 TABLET, FILM COATED ORAL DAILY
Status: DISCONTINUED | OUTPATIENT
Start: 2019-04-06 | End: 2019-04-10 | Stop reason: HOSPADM

## 2019-04-06 RX ORDER — LISINOPRIL 10 MG/1
10 TABLET ORAL DAILY
Status: DISCONTINUED | OUTPATIENT
Start: 2019-04-06 | End: 2019-04-10 | Stop reason: HOSPADM

## 2019-04-06 RX ADMIN — Medication 10 ML: at 21:40

## 2019-04-06 RX ADMIN — DIVALPROEX SODIUM 250 MG: 250 TABLET, DELAYED RELEASE ORAL at 21:40

## 2019-04-06 RX ADMIN — ENOXAPARIN SODIUM 40 MG: 40 INJECTION SUBCUTANEOUS at 10:15

## 2019-04-06 RX ADMIN — HYDROCODONE BITARTRATE AND ACETAMINOPHEN 2 TABLET: 5; 325 TABLET ORAL at 00:30

## 2019-04-06 RX ADMIN — QUETIAPINE FUMARATE 50 MG: 25 TABLET ORAL at 21:40

## 2019-04-06 RX ADMIN — MAGNESIUM SULFATE HEPTAHYDRATE 2 G: 40 INJECTION, SOLUTION INTRAVENOUS at 00:03

## 2019-04-06 RX ADMIN — RIVASTIGMINE TARTRATE 3 MG: 3 CAPSULE ORAL at 21:40

## 2019-04-06 RX ADMIN — Medication 1 G: at 21:41

## 2019-04-06 RX ADMIN — Medication 10 ML: at 10:16

## 2019-04-06 RX ADMIN — TRAZODONE HYDROCHLORIDE 50 MG: 50 TABLET ORAL at 21:40

## 2019-04-06 RX ADMIN — LORAZEPAM 0.25 MG: 2 INJECTION INTRAMUSCULAR; INTRAVENOUS at 11:24

## 2019-04-06 RX ADMIN — CEFTRIAXONE 1 G: 1 INJECTION, POWDER, FOR SOLUTION INTRAMUSCULAR; INTRAVENOUS at 00:02

## 2019-04-06 ASSESSMENT — PAIN SCALES - GENERAL
PAINLEVEL_OUTOF10: 8
PAINLEVEL_OUTOF10: 0

## 2019-04-06 NOTE — PROGRESS NOTES
12 hr chart ck done   Pt still sleeping but will awaken to turn when incont of stool.   Family waiting on md

## 2019-04-06 NOTE — CARE COORDINATION
PT's meds have had changes from 300 mg a day of serequil to 50 mg once at night, we are unable to figure out where the change came from. PT may need an admit, Dr. Aristides Avendano will be contacted. Still looking at acute and tomorrow long term locked facility options.  Electronically signed by Dinote Andrade on 4/5/2019 at 10:54 PM

## 2019-04-06 NOTE — PROGRESS NOTES
Spoke with Dr. Mariano Arboleda about patient being combative. She ordered 0.25 mg of ativan IV q 6 hrs prn. She also stated if we needed to then put restraints on.

## 2019-04-06 NOTE — PROGRESS NOTES
4 Eyes Skin Assessment    Nikki Fernandes is being assessed upon: Admission    I agree that I, Willow El, along with Artem Estrada, RN (either 2 RN's or 1 LPN and 1 RN) have performed a thorough Head to Toe Skin Assessment on the patient. ALL assessment sites listed below have been assessed. Areas assessed by both nurses:     [x]   Head, Face, and Ears   [x]   Shoulders, Back, and Chest  [x]   Arms, Elbows, and Hands   [x]   Coccyx, Sacrum, and Ischium  [x]   Legs, Feet, and Heels    Does the Patient have Skin Breakdown? Yes, wound(s) noted upon assessment. It is the responsibility of the Primary Nurse to assure that the following documentation, preventions, orders, and consults are complete on the above noted wound(s): Wound LDA initiated. LDA Flowsheet Documentation includes the Loree-wound, Wound Assessment, Measurements, Dressing Treatment, Drainage, and Color.     Braulio Prevention initiated: Yes  Wound Care Orders initiated: Yes    [address]  nurse consulted for Pressure Injury (Stage 3,4, Unstageable, DTI, NWPT, and Complex wounds) and New or Established Ostomies: No        Primary Nurse eSignature: Willow El RN on 4/6/2019 at 4:01 AM      Co-Signer eSignature: Electronically signed by Pili Chavez RN on 4/6/19 at 4:03 AM

## 2019-04-06 NOTE — CARE COORDINATION
SW and medical staff currently investigating why SNF was not giving proper medications, why the patient has become combative when he was not previously, the cause of UTI and possible bladder infection, why the SNF brought to ER stating PT has been placed in [facility] but had not, and if SNF had reason to discharge PT, results will determine if Caro Zamudio is contacted.  Electronically signed by Scott Tovar on 4/5/2019 at 11:12 PM
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
## 2019-04-06 NOTE — CARE COORDINATION
PT having difficulty finding acute placement due to insurance out of network, being discharged from SNF with no DC plan, and unable to participate in group therapy. May have to look for SNF long term placement with locked unit. PT was here a few days ago and spoke with previous  on the case who stated PT was not aggressive and mostly just laid around, he is surprised in the change and unaware of what could have triggered such a drastic change.  Electronically signed by Arlys Nageotte on 4/5/2019 at 9:46 PM

## 2019-04-06 NOTE — ED NOTES
Pt's family state that the recorded meds are not the same dosages as what pt was taking at home. Pt's seroquel and trazodone has been lowered significantly over the last 5 days. Pt's family are concerned that this is the reason for change in behavior.  Dr. Rufino Burton notified of status      Nancy Mccoy RN  04/06/19 7258

## 2019-04-06 NOTE — ED NOTES
Pt incontinent of urine and bowels, Pt cleaned and dried. Pt refused to turn over to have brief applied. Skin breakdown noted to cocyx stage 1, mepilex applied to area.       Gerard Palmer RN  04/06/19 9731

## 2019-04-07 LAB
ANION GAP SERPL CALCULATED.3IONS-SCNC: 11 MMOL/L (ref 7–19)
ANION GAP SERPL CALCULATED.3IONS-SCNC: 12 MMOL/L (ref 7–19)
BASOPHILS ABSOLUTE: 0 K/UL (ref 0–0.2)
BASOPHILS RELATIVE PERCENT: 0.4 % (ref 0–1)
BUN BLDV-MCNC: 10 MG/DL (ref 8–23)
BUN BLDV-MCNC: 9 MG/DL (ref 8–23)
CALCIUM SERPL-MCNC: 9.1 MG/DL (ref 8.8–10.2)
CALCIUM SERPL-MCNC: 9.5 MG/DL (ref 8.8–10.2)
CHLORIDE BLD-SCNC: 100 MMOL/L (ref 98–111)
CHLORIDE BLD-SCNC: 103 MMOL/L (ref 98–111)
CO2: 29 MMOL/L (ref 22–29)
CO2: 32 MMOL/L (ref 22–29)
CREAT SERPL-MCNC: 0.5 MG/DL (ref 0.5–1.2)
CREAT SERPL-MCNC: <0.5 MG/DL (ref 0.5–1.2)
EKG P AXIS: 68 DEGREES
EKG P-R INTERVAL: 214 MS
EKG Q-T INTERVAL: 412 MS
EKG QRS DURATION: 112 MS
EKG QTC CALCULATION (BAZETT): 455 MS
EKG T AXIS: 117 DEGREES
EOSINOPHILS ABSOLUTE: 0.2 K/UL (ref 0–0.6)
EOSINOPHILS RELATIVE PERCENT: 2 % (ref 0–5)
GFR NON-AFRICAN AMERICAN: >60
GFR NON-AFRICAN AMERICAN: >60
GLUCOSE BLD-MCNC: 111 MG/DL (ref 74–109)
GLUCOSE BLD-MCNC: 83 MG/DL (ref 74–109)
HCT VFR BLD CALC: 41.1 % (ref 42–52)
HEMOGLOBIN: 13.7 G/DL (ref 14–18)
LYMPHOCYTES ABSOLUTE: 1.6 K/UL (ref 1.1–4.5)
LYMPHOCYTES RELATIVE PERCENT: 16.7 % (ref 20–40)
MCH RBC QN AUTO: 32.9 PG (ref 27–31)
MCHC RBC AUTO-ENTMCNC: 33.3 G/DL (ref 33–37)
MCV RBC AUTO: 98.6 FL (ref 80–94)
MONOCYTES ABSOLUTE: 1.5 K/UL (ref 0–0.9)
MONOCYTES RELATIVE PERCENT: 15.3 % (ref 0–10)
NEUTROPHILS ABSOLUTE: 6.2 K/UL (ref 1.5–7.5)
NEUTROPHILS RELATIVE PERCENT: 65.1 % (ref 50–65)
PDW BLD-RTO: 14.5 % (ref 11.5–14.5)
PLATELET # BLD: 232 K/UL (ref 130–400)
PMV BLD AUTO: 10.2 FL (ref 9.4–12.4)
POTASSIUM SERPL-SCNC: 2.7 MMOL/L (ref 3.5–5)
POTASSIUM SERPL-SCNC: 3.1 MMOL/L (ref 3.5–5)
RBC # BLD: 4.17 M/UL (ref 4.7–6.1)
SODIUM BLD-SCNC: 143 MMOL/L (ref 136–145)
SODIUM BLD-SCNC: 144 MMOL/L (ref 136–145)
WBC # BLD: 9.5 K/UL (ref 4.8–10.8)

## 2019-04-07 PROCEDURE — 80048 BASIC METABOLIC PNL TOTAL CA: CPT

## 2019-04-07 PROCEDURE — 36415 COLL VENOUS BLD VENIPUNCTURE: CPT

## 2019-04-07 PROCEDURE — 6370000000 HC RX 637 (ALT 250 FOR IP): Performed by: FAMILY MEDICINE

## 2019-04-07 PROCEDURE — 6360000002 HC RX W HCPCS: Performed by: FAMILY MEDICINE

## 2019-04-07 PROCEDURE — 1210000000 HC MED SURG R&B

## 2019-04-07 PROCEDURE — 85025 COMPLETE CBC W/AUTO DIFF WBC: CPT

## 2019-04-07 PROCEDURE — 2580000003 HC RX 258: Performed by: FAMILY MEDICINE

## 2019-04-07 RX ORDER — POTASSIUM CHLORIDE 20 MEQ/1
40 TABLET, EXTENDED RELEASE ORAL ONCE
Status: COMPLETED | OUTPATIENT
Start: 2019-04-07 | End: 2019-04-07

## 2019-04-07 RX ADMIN — ENOXAPARIN SODIUM 40 MG: 40 INJECTION SUBCUTANEOUS at 07:29

## 2019-04-07 RX ADMIN — DIVALPROEX SODIUM 250 MG: 250 TABLET, DELAYED RELEASE ORAL at 07:30

## 2019-04-07 RX ADMIN — RIVASTIGMINE TARTRATE 3 MG: 3 CAPSULE ORAL at 21:46

## 2019-04-07 RX ADMIN — LORAZEPAM 0.25 MG: 2 INJECTION INTRAMUSCULAR; INTRAVENOUS at 02:45

## 2019-04-07 RX ADMIN — POTASSIUM CHLORIDE 40 MEQ: 20 TABLET, EXTENDED RELEASE ORAL at 11:58

## 2019-04-07 RX ADMIN — Medication 10 ML: at 21:45

## 2019-04-07 RX ADMIN — HYDROCHLOROTHIAZIDE 25 MG: 25 TABLET ORAL at 07:30

## 2019-04-07 RX ADMIN — QUETIAPINE FUMARATE 50 MG: 25 TABLET ORAL at 21:45

## 2019-04-07 RX ADMIN — TAMSULOSIN HYDROCHLORIDE 0.4 MG: 0.4 CAPSULE ORAL at 07:30

## 2019-04-07 RX ADMIN — Medication 10 ML: at 07:30

## 2019-04-07 RX ADMIN — ATORVASTATIN CALCIUM 10 MG: 20 TABLET, FILM COATED ORAL at 07:30

## 2019-04-07 RX ADMIN — ASPIRIN 325 MG ORAL TABLET 325 MG: 325 PILL ORAL at 07:30

## 2019-04-07 RX ADMIN — LISINOPRIL 10 MG: 10 TABLET ORAL at 07:30

## 2019-04-07 RX ADMIN — POTASSIUM CHLORIDE 40 MEQ: 20 TABLET, EXTENDED RELEASE ORAL at 21:46

## 2019-04-07 RX ADMIN — MEMANTINE HYDROCHLORIDE 10 MG: 5 TABLET ORAL at 07:30

## 2019-04-07 RX ADMIN — POTASSIUM CHLORIDE 40 MEQ: 20 TABLET, EXTENDED RELEASE ORAL at 07:18

## 2019-04-07 RX ADMIN — QUETIAPINE FUMARATE 50 MG: 25 TABLET ORAL at 07:30

## 2019-04-07 RX ADMIN — TRAZODONE HYDROCHLORIDE 50 MG: 50 TABLET ORAL at 21:56

## 2019-04-07 RX ADMIN — RIVASTIGMINE TARTRATE 3 MG: 3 CAPSULE ORAL at 07:30

## 2019-04-07 RX ADMIN — DIVALPROEX SODIUM 250 MG: 250 TABLET, DELAYED RELEASE ORAL at 15:01

## 2019-04-07 RX ADMIN — DIVALPROEX SODIUM 250 MG: 250 TABLET, DELAYED RELEASE ORAL at 21:46

## 2019-04-07 ASSESSMENT — PAIN SCALES - GENERAL
PAINLEVEL_OUTOF10: 0
PAINLEVEL_OUTOF10: 0

## 2019-04-07 NOTE — PLAN OF CARE
minimized  Description  Complications related to the disease process, condition or treatment will be avoided or minimized  4/7/2019 0327 by Patrick Lara LPN  Outcome: Ongoing  4/7/2019 0005 by Patrick Lara LPN  Outcome: Ongoing  4/7/2019 0004 by Patrick Lara LPN  Outcome: Ongoing     Problem: Mental Status - Impaired:  Goal: Mental status will improve  Description  Mental status will improve  4/7/2019 0327 by Patrick Lara LPN  Outcome: Ongoing  4/7/2019 0005 by Patrick Lara LPN  Outcome: Ongoing

## 2019-04-07 NOTE — PLAN OF CARE
Britta Alvarenga RN  Outcome: Ongoing  4/7/2019 0327 by Ambrose Bhakta LPN  Outcome: Ongoing  4/7/2019 0005 by Ambrose Bhakta LPN  Outcome: Ongoing  4/7/2019 0004 by Ambrose Bhakta LPN  Outcome: Ongoing  Goal: Complications related to the disease process, condition or treatment will be avoided or minimized  Description  Complications related to the disease process, condition or treatment will be avoided or minimized  4/7/2019 0901 by Shawanda Chavez RN  Outcome: Ongoing  4/7/2019 0327 by Ambrose Bhakta LPN  Outcome: Ongoing  4/7/2019 0005 by Ambrose Bhakta LPN  Outcome: Ongoing  4/7/2019 0004 by Ambrose Bhakta LPN  Outcome: Ongoing     Problem: Mental Status - Impaired:  Goal: Mental status will improve  Description  Mental status will improve  4/7/2019 0901 by Shawanda Chavez RN  Outcome: Ongoing  4/7/2019 0327 by Ambrose Bhakta LPN  Outcome: Ongoing  4/7/2019 0005 by Ambrose Bhakta LPN  Outcome: Ongoing

## 2019-04-07 NOTE — PLAN OF CARE
Nutrition Problem: Inadequate oral intake  Intervention: Food and/or Nutrient Delivery: Continue current diet, Start ONS  Nutritional Goals: Pt will show s/s healing

## 2019-04-07 NOTE — PROGRESS NOTES
Efren d/c'ed. Family on the way to sit with pt. Malachis ordered. Per Dr. Ming Wright.       Electronically signed by Brandon Plunkett RN on 4/7/2019 at 11:03 AM
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
## 2019-04-07 NOTE — PLAN OF CARE
Problem: Falls - Risk of:  Goal: Will remain free from falls  Description  Will remain free from falls  4/7/2019 0005 by Constantine Rodriguez LPN  Outcome: Met This Shift  4/6/2019 1229 by Ezequiel Rousseau RN  Outcome: Ongoing  Goal: Absence of physical injury  Description  Absence of physical injury  4/7/2019 0005 by Constantine Rodriguez LPN  Outcome: Met This Shift  4/6/2019 1229 by Ezequiel Rousseau RN  Outcome: Ongoing     Problem: Risk for Impaired Skin Integrity  Goal: Tissue integrity - skin and mucous membranes  Description  Structural intactness and normal physiological function of skin and  mucous membranes.   4/7/2019 0005 by Constantine Rodriguez LPN  Outcome: Ongoing  4/7/2019 0004 by Constantine Rodriguez LPN  Outcome: Ongoing  4/6/2019 1229 by Ezequiel Rousseau RN  Outcome: Ongoing     Problem: Sensory:  Goal: General experience of comfort will improve  Description  General experience of comfort will improve  4/7/2019 0005 by Constantine Rodriguez LPN  Outcome: Ongoing  4/7/2019 0004 by Constantine Rodriguez LPN  Outcome: Ongoing     Problem: Urinary Elimination:  Goal: Signs and symptoms of infection will decrease  Description  Signs and symptoms of infection will decrease  4/7/2019 0005 by Constantine Rodriguez LPN  Outcome: Ongoing  4/7/2019 0004 by Contsantine Rodriguez LPN  Outcome: Ongoing  Goal: Ability to reestablish a normal urinary elimination pattern will improve - after catheter removal  Description  Ability to reestablish a normal urinary elimination pattern will improve  4/7/2019 0005 by Constantine Rodriguez LPN  Outcome: Ongoing  4/7/2019 0004 by Constantine Rodriguez LPN  Outcome: Ongoing  Goal: Complications related to the disease process, condition or treatment will be avoided or minimized  Description  Complications related to the disease process, condition or treatment will be avoided or minimized  4/7/2019 0005 by Constantine Rodriguez LPN  Outcome: Ongoing  4/7/2019 0004 by Constantine Rodriguez LPN  Outcome: Ongoing     Problem: Mental Status - Impaired:  Goal: Mental status will improve  Description  Mental status will improve  Outcome: Ongoing

## 2019-04-07 NOTE — PROGRESS NOTES
Pt provided jael care and a complete bed change. Mepilex applied to coccyx for redness. Mepilex applied to rt hip for small, dime size wound. No further needs at this time.      Electronically signed by Merlin Zamudio RN on 4/7/2019 at 2:00 PM

## 2019-04-07 NOTE — PROGRESS NOTES
Mental Status/Confusion      Electronically signed by London Cline RD, LD on 4/7/19 at 11:41 AM    Contact Number: [phone]

## 2019-04-07 NOTE — PROGRESS NOTES
Patient has a sitter at the bedside; patient took his night time medications; patient is resting in bed at this time; no distress noted; no further needs at this time. Electronically signed by Kashif Irving LPN on 4/3/0858 at 13:67 PM

## 2019-04-07 NOTE — PROGRESS NOTES
Progress Note  Fidel Dean  4/7/2019 6:50 PM  Subjective:   Admit Date:   4/5/2019      CC/ADMIT DX:       Interval History:   Reviewed overnight events and nursing notes. No pain issues. He has been less agitated. I have reviewed all labs/diagnostics from the last 24hrs. ROS:   I have done a 10 point ROS and all are negative, except what is mentioned in the HPI. DIET GENERAL;  Dietary Nutrition Supplements: Standard High Calorie Oral Supplement    Medications:      potassium chloride  40 mEq Oral Once    sodium chloride flush  10 mL Intravenous 2 times per day    enoxaparin  40 mg Subcutaneous Daily    aspirin  325 mg Oral Daily    atorvastatin  10 mg Oral Daily    divalproex  250 mg Oral TID    hydrochlorothiazide  25 mg Oral Daily    lisinopril  10 mg Oral Daily    memantine  10 mg Oral Daily    rivastigmine  3 mg Oral BID    tamsulosin  0.4 mg Oral Daily    traZODone  50 mg Oral Nightly    QUEtiapine  50 mg Oral BID           Objective:   Vitals: BP (!) 101/57   Pulse 66   Temp 96.8 °F (36 °C) (Temporal)   Resp 16   Ht 5' 11\" (1.803 m)   Wt 200 lb (90.7 kg)   SpO2 90%   BMI 27.89 kg/m²      Intake/Output Summary (Last 24 hours) at 4/7/2019 1850  Last data filed at 4/7/2019 1416  Gross per 24 hour   Intake 530 ml   Output --   Net 530 ml     General appearance: resting  Lungs: clear to auscultation bilaterally  Heart: RRR  Abdomen: soft, non-tender; bowel sounds normal; no masses,  no organomegaly  Extremities: extremities normal, atraumatic, no cyanosis or edema  Neurologic:  No obvious focal neurologic deficits. Assessment and Plan: Active Problems:    UTI (urinary tract infection)  Resolved Problems:    * No resolved hospital problems. *    Dementia with agitation    Plan:  1. Continue present medication(s)   2. Continue treatment of UTI  3. Replace K+  4. Recheck lab in am  5. Continue BID Seroquel and adjust if needed  6.   Will need new placement      Discharge planning:   a skilled nursing facility     Reviewed treatment plans with the patient and/or family.              Electronically signed by Malini Roy MD on 4/7/2019 at 6:50 PM

## 2019-04-08 LAB
ANION GAP SERPL CALCULATED.3IONS-SCNC: 10 MMOL/L (ref 7–19)
BUN BLDV-MCNC: 9 MG/DL (ref 8–23)
CALCIUM SERPL-MCNC: 9.3 MG/DL (ref 8.8–10.2)
CHLORIDE BLD-SCNC: 105 MMOL/L (ref 98–111)
CO2: 29 MMOL/L (ref 22–29)
CREAT SERPL-MCNC: <0.5 MG/DL (ref 0.5–1.2)
GFR NON-AFRICAN AMERICAN: >60
GLUCOSE BLD-MCNC: 89 MG/DL (ref 74–109)
MAGNESIUM: 2 MG/DL (ref 1.6–2.4)
ORGANISM: ABNORMAL
POTASSIUM SERPL-SCNC: 3.6 MMOL/L (ref 3.5–5)
SODIUM BLD-SCNC: 144 MMOL/L (ref 136–145)
URINE CULTURE, ROUTINE: ABNORMAL
URINE CULTURE, ROUTINE: ABNORMAL

## 2019-04-08 PROCEDURE — 83735 ASSAY OF MAGNESIUM: CPT

## 2019-04-08 PROCEDURE — 6370000000 HC RX 637 (ALT 250 FOR IP): Performed by: PHYSICIAN ASSISTANT

## 2019-04-08 PROCEDURE — 6370000000 HC RX 637 (ALT 250 FOR IP): Performed by: FAMILY MEDICINE

## 2019-04-08 PROCEDURE — 80048 BASIC METABOLIC PNL TOTAL CA: CPT

## 2019-04-08 PROCEDURE — 1210000000 HC MED SURG R&B

## 2019-04-08 PROCEDURE — 6360000002 HC RX W HCPCS: Performed by: FAMILY MEDICINE

## 2019-04-08 PROCEDURE — 36415 COLL VENOUS BLD VENIPUNCTURE: CPT

## 2019-04-08 RX ORDER — QUETIAPINE FUMARATE 100 MG/1
100 TABLET, FILM COATED ORAL 2 TIMES DAILY
Status: DISCONTINUED | OUTPATIENT
Start: 2019-04-08 | End: 2019-04-10 | Stop reason: HOSPADM

## 2019-04-08 RX ORDER — TRAZODONE HYDROCHLORIDE 100 MG/1
100 TABLET ORAL NIGHTLY
Status: DISCONTINUED | OUTPATIENT
Start: 2019-04-08 | End: 2019-04-10 | Stop reason: HOSPADM

## 2019-04-08 RX ORDER — LEVOFLOXACIN 250 MG/1
500 TABLET ORAL DAILY
Status: DISCONTINUED | OUTPATIENT
Start: 2019-04-08 | End: 2019-04-10 | Stop reason: HOSPADM

## 2019-04-08 RX ADMIN — ENOXAPARIN SODIUM 40 MG: 40 INJECTION SUBCUTANEOUS at 10:50

## 2019-04-08 RX ADMIN — LISINOPRIL 10 MG: 10 TABLET ORAL at 10:52

## 2019-04-08 RX ADMIN — HYDROCHLOROTHIAZIDE 25 MG: 25 TABLET ORAL at 10:53

## 2019-04-08 RX ADMIN — TRAZODONE HYDROCHLORIDE 100 MG: 100 TABLET ORAL at 20:09

## 2019-04-08 RX ADMIN — RIVASTIGMINE TARTRATE 3 MG: 3 CAPSULE ORAL at 10:52

## 2019-04-08 RX ADMIN — MEMANTINE HYDROCHLORIDE 10 MG: 5 TABLET ORAL at 10:52

## 2019-04-08 RX ADMIN — ASPIRIN 325 MG ORAL TABLET 325 MG: 325 PILL ORAL at 10:52

## 2019-04-08 RX ADMIN — QUETIAPINE FUMARATE 100 MG: 100 TABLET ORAL at 10:50

## 2019-04-08 RX ADMIN — TAMSULOSIN HYDROCHLORIDE 0.4 MG: 0.4 CAPSULE ORAL at 10:52

## 2019-04-08 RX ADMIN — ATORVASTATIN CALCIUM 10 MG: 20 TABLET, FILM COATED ORAL at 10:50

## 2019-04-08 RX ADMIN — DIVALPROEX SODIUM 250 MG: 250 TABLET, DELAYED RELEASE ORAL at 10:50

## 2019-04-08 RX ADMIN — LEVOFLOXACIN 500 MG: 250 TABLET, FILM COATED ORAL at 10:50

## 2019-04-08 RX ADMIN — LORAZEPAM 0.25 MG: 2 INJECTION INTRAMUSCULAR; INTRAVENOUS at 03:54

## 2019-04-08 RX ADMIN — QUETIAPINE FUMARATE 100 MG: 100 TABLET ORAL at 20:08

## 2019-04-08 RX ADMIN — RIVASTIGMINE TARTRATE 3 MG: 3 CAPSULE ORAL at 20:08

## 2019-04-08 RX ADMIN — DIVALPROEX SODIUM 250 MG: 250 TABLET, DELAYED RELEASE ORAL at 15:37

## 2019-04-08 RX ADMIN — DIVALPROEX SODIUM 250 MG: 250 TABLET, DELAYED RELEASE ORAL at 20:08

## 2019-04-08 ASSESSMENT — PAIN SCALES - GENERAL
PAINLEVEL_OUTOF10: 0
PAINLEVEL_OUTOF10: 0

## 2019-04-08 NOTE — PLAN OF CARE
Problem: Falls - Risk of:  Goal: Will remain free from falls  Description  Will remain free from falls  4/8/2019 1125 by Emil Field RN  Outcome: Ongoing  4/8/2019 0317 by Rai Cormier LPN  Outcome: Met This Shift  4/7/2019 2357 by Rai Cormier LPN  Outcome: Met This Shift  Goal: Absence of physical injury  Description  Absence of physical injury  4/8/2019 1125 by Emil Field RN  Outcome: Ongoing  4/8/2019 0317 by Rai Cormier LPN  Outcome: Met This Shift  4/7/2019 2357 by Rai Cormier LPN  Outcome: Met This Shift     Problem: Risk for Impaired Skin Integrity  Goal: Tissue integrity - skin and mucous membranes  Description  Structural intactness and normal physiological function of skin and  mucous membranes.   4/8/2019 1125 by Emil Field RN  Outcome: Ongoing  4/8/2019 0317 by Rai Cormier LPN  Outcome: Ongoing  4/7/2019 2357 by Rai Cormier LPN  Outcome: Ongoing     Problem: Sensory:  Goal: General experience of comfort will improve  Description  General experience of comfort will improve  4/8/2019 1125 by Emil Field RN  Outcome: Ongoing  4/8/2019 0317 by Rai Cormier LPN  Outcome: Ongoing  4/7/2019 2357 by Rai Cormier LPN  Outcome: Ongoing     Problem: Urinary Elimination:  Goal: Signs and symptoms of infection will decrease  Description  Signs and symptoms of infection will decrease  4/8/2019 1125 by Emil Field RN  Outcome: Ongoing  4/8/2019 0317 by Rai Cormier LPN  Outcome: Ongoing  4/7/2019 2357 by Rai Cormeir LPN  Outcome: Ongoing  Goal: Ability to reestablish a normal urinary elimination pattern will improve - after catheter removal  Description  Ability to reestablish a normal urinary elimination pattern will improve  4/8/2019 1125 by Emil Field RN  Outcome: Ongoing  4/8/2019 0317 by Rai Cormier LPN  Outcome: Ongoing  4/7/2019 2357 by Rai Cormier LPN  Outcome: Ongoing  Goal: Complications related to the disease process, condition or treatment will be avoided or minimized  Description  Complications related to the disease process, condition or treatment will be avoided or minimized  4/8/2019 1125 by Alfred Joe RN  Outcome: Ongoing  4/8/2019 0317 by Real Garcia LPN  Outcome: Ongoing  4/7/2019 2357 by Real Garcia LPN  Outcome: Ongoing     Problem: Mental Status - Impaired:  Goal: Mental status will improve  Description  Mental status will improve  4/8/2019 1125 by Alfred Joe RN  Outcome: Ongoing  4/8/2019 0317 by Real Garcia LPN  Outcome: Ongoing  4/7/2019 2357 by Real Garcia LPN  Outcome: Ongoing     Problem: Nutrition  Goal: Optimal nutrition therapy  Description  Nutrition Problem: Inadequate oral intake  Intervention: Food and/or Nutrient Delivery: Continue current diet, Start ONS  Nutritional Goals: Pt will show s/s healing     4/8/2019 1125 by Alfred Joe RN  Outcome: Ongoing  4/8/2019 0317 by Real Garcia LPN  Outcome: Ongoing  4/7/2019 2357 by Real Garcia LPN  Outcome: Ongoing

## 2019-04-08 NOTE — PROGRESS NOTES
12 hour chart check review completed; pt did take his night time medications; pt has an avasys monitor in use at this time; no distress noted; no further needs at this time. Electronically signed by Zahra Castro LPN on 6/5/1854 at 45:56 PM

## 2019-04-08 NOTE — PLAN OF CARE
Problem: Falls - Risk of:  Goal: Will remain free from falls  Description  Will remain free from falls  4/8/2019 0317 by Teodora Pappas LPN  Outcome: Met This Shift  4/7/2019 2357 by Teodora Pappas LPN  Outcome: Met This Shift  Goal: Absence of physical injury  Description  Absence of physical injury  4/8/2019 0317 by Teodora Pappas LPN  Outcome: Met This Shift  4/7/2019 2357 by Teodora Pappas LPN  Outcome: Met This Shift     Problem: Risk for Impaired Skin Integrity  Goal: Tissue integrity - skin and mucous membranes  Description  Structural intactness and normal physiological function of skin and  mucous membranes.   4/8/2019 0317 by Teodora Pappas LPN  Outcome: Ongoing  4/7/2019 2357 by Teodora Pappas LPN  Outcome: Ongoing     Problem: Sensory:  Goal: General experience of comfort will improve  Description  General experience of comfort will improve  4/8/2019 0317 by Teodora Pappas LPN  Outcome: Ongoing  4/7/2019 2357 by Teodora Pappas LPN  Outcome: Ongoing     Problem: Urinary Elimination:  Goal: Signs and symptoms of infection will decrease  Description  Signs and symptoms of infection will decrease  4/8/2019 0317 by Teodora Pappas LPN  Outcome: Ongoing  4/7/2019 2357 by Teodora Pappas LPN  Outcome: Ongoing  Goal: Ability to reestablish a normal urinary elimination pattern will improve - after catheter removal  Description  Ability to reestablish a normal urinary elimination pattern will improve  4/8/2019 0317 by Teodora Pappas LPN  Outcome: Ongoing  4/7/2019 2357 by Teodora Pappas LPN  Outcome: Ongoing  Goal: Complications related to the disease process, condition or treatment will be avoided or minimized  Description  Complications related to the disease process, condition or treatment will be avoided or minimized  4/8/2019 0317 by Teodora Pappas LPN  Outcome: Ongoing  4/7/2019 2357 by Teodora Pappas LPN  Outcome: Ongoing     Problem: Mental Status - Impaired:  Goal: Mental status will improve  Description  Mental status will improve  4/8/2019 0317 by Teodora Pappas LPN  Outcome: Ongoing  4/7/2019 2357 by Teodora Pappas LPN  Outcome: Ongoing     Problem: Nutrition  Goal: Optimal nutrition therapy  Description  Nutrition Problem: Inadequate oral intake  Intervention: Food and/or Nutrient Delivery: Continue current diet, Start ONS  Nutritional Goals: Pt will show s/s healing     4/8/2019 0317 by Teodora Pappas LPN  Outcome: Ongoing  4/7/2019 2357 by Teodora Pappas LPN  Outcome: Ongoing

## 2019-04-08 NOTE — PROGRESS NOTES
[facility]  Daily Progress Note  Leyla Santiago  MRN: [id number] LOS: 3    Admit Date: 4/5/2019 4/8/2019 7:47 AM          Chief Complaint:  Chief Complaint   Patient presents with    Aggressive Behavior       Interval History:    Reviewed overnight events and nursing notes. Status:  improved  Pain:  some relief, overnight events discussed with staff. No new complaints voiced. Patient appears calm 12. No chest pain no shortness of breath. DIET:  DIET GENERAL;  Dietary Nutrition Supplements: Standard High Calorie Oral Supplement    Medications:      sodium chloride flush  10 mL Intravenous 2 times per day    enoxaparin  40 mg Subcutaneous Daily    aspirin  325 mg Oral Daily    atorvastatin  10 mg Oral Daily    divalproex  250 mg Oral TID    hydrochlorothiazide  25 mg Oral Daily    lisinopril  10 mg Oral Daily    memantine  10 mg Oral Daily    rivastigmine  3 mg Oral BID    tamsulosin  0.4 mg Oral Daily    traZODone  50 mg Oral Nightly    QUEtiapine  50 mg Oral BID       Data:     Code Status: DNR-CC    History reviewed. No pertinent family history. Social History     Socioeconomic History    Marital status:      Spouse name: Not on file    Number of children: Not on file    Years of education: Not on file    Highest education level: Not on file   Occupational History    Not on file   Social Needs    Financial resource strain: Not on file    Food insecurity:     Worry: Not on file     Inability: Not on file    Transportation needs:     Medical: Not on file     Non-medical: Not on file   Tobacco Use    Smoking status: Current Every Day Smoker     Types: Cigarettes     Start date: 12    Smokeless tobacco: Never Used   Substance and Sexual Activity    Alcohol use:  Yes     Alcohol/week: 2.4 oz     Types: 4 Glasses of wine per week     Frequency: Monthly or less     Drinks per session: 1 or 2     Binge frequency: Never    Drug use: Never    Sexual activity: Not Currently   Lifestyle    Physical activity:     Days per week: Not on file     Minutes per session: Not on file    Stress: Not on file   Relationships    Social connections:     Talks on phone: Not on file     Gets together: Not on file     Attends [employer] service: Not on file     Active member of club or organization: Not on file     Attends meetings of clubs or organizations: Not on file     Relationship status: Not on file    Intimate partner violence:     Fear of current or ex partner: Not on file     Emotionally abused: Not on file     Physically abused: Not on file     Forced sexual activity: Not on file   Other Topics Concern    Not on file   Social History Narrative    Not on file       Labs:  Hematology:  Recent Labs     19  1345 19  0546   WBC 7.0 9.5   HGB 13.3* 13.7*   HCT 38.1* 41.1*    232     Chemistry:  Recent Labs     19  1345  19  0546 19  1439 19  0652     --  144 143 144   K 2.4*   < > 2.7* 3.1* 3.6     --  103 100 105   CO2 29  --  29 32* 29   GLUCOSE 115*  --  83 111* 89   BUN 7*  --  9 10 9   CREATININE 0.5  --  <0.5 0.5 <0.5   MG 1.7  --   --   --  2.0   ANIONGAP 12  --  12 11 10   LABGLOM >60  --  >60 >60 >60   CALCIUM 9.2  --  9.1 9.5 9.3    < > = values in this interval not displayed. Recent Labs     19  1345   PROT 5.9*   LABALBU 3.0*   AST 48*   ALT 26   ALKPHOS 45   BILITOT 0.5       Objective:     Vitals: /68   Pulse 70   Temp 98 °F (36.7 °C) (Temporal)   Resp 16   Ht 5' 11\" (1.803 m)   Wt 200 lb (90.7 kg)   SpO2 92%   BMI 27.89 kg/m²      Intake/Output Summary (Last 24 hours) at 2019 0747  Last data filed at 2019 0430  Gross per 24 hour   Intake 480 ml   Output --   Net 480 ml    Temp (24hrs), Av.5 °F (36.4 °C), Min:96.8 °F (36 °C), Max:98 °F (36.7 °C)    Glucose:  No results for input(s): POCGLU in the last 72 hours. Physical Examination:   General appearance - no distress.  Awakens to complete documentation.      Electronically signed by Johanna Rosenbaum MD on 4/8/2019 at 8:36 AM  .

## 2019-04-08 NOTE — PLAN OF CARE
Problem: Falls - Risk of:  Goal: Will remain free from falls  Description  Will remain free from falls  Outcome: Met This Shift  Goal: Absence of physical injury  Description  Absence of physical injury  Outcome: Met This Shift     Problem: Risk for Impaired Skin Integrity  Goal: Tissue integrity - skin and mucous membranes  Description  Structural intactness and normal physiological function of skin and  mucous membranes.   Outcome: Ongoing     Problem: Sensory:  Goal: General experience of comfort will improve  Description  General experience of comfort will improve  Outcome: Ongoing     Problem: Urinary Elimination:  Goal: Signs and symptoms of infection will decrease  Description  Signs and symptoms of infection will decrease  Outcome: Ongoing  Goal: Ability to reestablish a normal urinary elimination pattern will improve - after catheter removal  Description  Ability to reestablish a normal urinary elimination pattern will improve  Outcome: Ongoing  Goal: Complications related to the disease process, condition or treatment will be avoided or minimized  Description  Complications related to the disease process, condition or treatment will be avoided or minimized  Outcome: Ongoing     Problem: Mental Status - Impaired:  Goal: Mental status will improve  Description  Mental status will improve  Outcome: Ongoing     Problem: Nutrition  Goal: Optimal nutrition therapy  Description  Nutrition Problem: Inadequate oral intake  Intervention: Food and/or Nutrient Delivery: Continue current diet, Start ONS  Nutritional Goals: Pt will show s/s healing     4/7/2019 7727 by Jeancarlos Duarte LPN  Outcome: Ongoing  4/7/2019 1141 by Nguyen Gaviria RD, LD  Outcome: Ongoing
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
## 2019-04-09 PROCEDURE — 97165 OT EVAL LOW COMPLEX 30 MIN: CPT

## 2019-04-09 PROCEDURE — 6370000000 HC RX 637 (ALT 250 FOR IP): Performed by: PHYSICIAN ASSISTANT

## 2019-04-09 PROCEDURE — 97535 SELF CARE MNGMENT TRAINING: CPT

## 2019-04-09 PROCEDURE — 97530 THERAPEUTIC ACTIVITIES: CPT

## 2019-04-09 PROCEDURE — 6360000002 HC RX W HCPCS: Performed by: FAMILY MEDICINE

## 2019-04-09 PROCEDURE — 1210000000 HC MED SURG R&B

## 2019-04-09 PROCEDURE — 6370000000 HC RX 637 (ALT 250 FOR IP): Performed by: FAMILY MEDICINE

## 2019-04-09 PROCEDURE — 97161 PT EVAL LOW COMPLEX 20 MIN: CPT

## 2019-04-09 RX ORDER — QUETIAPINE FUMARATE 100 MG/1
100 TABLET, FILM COATED ORAL 2 TIMES DAILY
Qty: 60 TABLET | Refills: 3 | Status: SHIPPED | OUTPATIENT
Start: 2019-04-09

## 2019-04-09 RX ORDER — LEVOFLOXACIN 500 MG/1
500 TABLET, FILM COATED ORAL DAILY
Qty: 5 TABLET | Refills: 0 | Status: SHIPPED | OUTPATIENT
Start: 2019-04-09 | End: 2019-04-14

## 2019-04-09 RX ADMIN — ENOXAPARIN SODIUM 40 MG: 40 INJECTION SUBCUTANEOUS at 08:03

## 2019-04-09 RX ADMIN — LEVOFLOXACIN 500 MG: 250 TABLET, FILM COATED ORAL at 08:03

## 2019-04-09 RX ADMIN — QUETIAPINE FUMARATE 100 MG: 100 TABLET ORAL at 08:03

## 2019-04-09 RX ADMIN — RIVASTIGMINE TARTRATE 3 MG: 3 CAPSULE ORAL at 08:03

## 2019-04-09 RX ADMIN — HYDROCHLOROTHIAZIDE 25 MG: 25 TABLET ORAL at 08:04

## 2019-04-09 RX ADMIN — MEMANTINE HYDROCHLORIDE 10 MG: 5 TABLET ORAL at 08:03

## 2019-04-09 RX ADMIN — ATORVASTATIN CALCIUM 10 MG: 20 TABLET, FILM COATED ORAL at 08:04

## 2019-04-09 RX ADMIN — ASPIRIN 325 MG ORAL TABLET 325 MG: 325 PILL ORAL at 08:03

## 2019-04-09 RX ADMIN — TAMSULOSIN HYDROCHLORIDE 0.4 MG: 0.4 CAPSULE ORAL at 08:04

## 2019-04-09 RX ADMIN — DIVALPROEX SODIUM 250 MG: 250 TABLET, DELAYED RELEASE ORAL at 08:04

## 2019-04-09 RX ADMIN — DIVALPROEX SODIUM 250 MG: 250 TABLET, DELAYED RELEASE ORAL at 15:37

## 2019-04-09 RX ADMIN — LISINOPRIL 10 MG: 10 TABLET ORAL at 08:04

## 2019-04-09 ASSESSMENT — PAIN SCALES - WONG BAKER
WONGBAKER_NUMERICALRESPONSE: 2
WONGBAKER_NUMERICALRESPONSE: 2

## 2019-04-09 NOTE — PROGRESS NOTES
Occupational Therapy   Occupational Therapy Initial Assessment  Date: 2019   Patient Name: Lety Stone  MRN: [id number]     : 1940    Date of Service: 2019    Discharge Recommendations:  Patient would benefit from continued therapy after discharge       Assessment   Performance deficits / Impairments: Decreased functional mobility ; Decreased ADL status; Decreased balance;Decreased endurance;Decreased coordination  Assessment: Pt benefits from skilled OT to address decreased pt I to complete functional mobility, balance, endurance, and ADL tasks s/p UTI  Treatment Diagnosis: UTI   Prognosis: Good  Decision Making: Low Complexity  REQUIRES OT FOLLOW UP: Yes  Activity Tolerance  Activity Tolerance: Patient Tolerated treatment well  Safety Devices  Safety Devices in place: Yes           Patient Diagnosis(es): The primary encounter diagnosis was Encephalopathy. Diagnoses of Urinary tract infection with hematuria, site unspecified and Hypokalemia were also pertinent to this visit. has a past medical history of Alzheimer's dementia, COPD with emphysema ([facility] [address] Hyperlipidemia, Hypertension, Osteoarthritis, Palliative care patient, and Sleep disturbance. has no past surgical history on file.     Treatment Diagnosis: UTI       Restrictions  Restrictions/Precautions  Restrictions/Precautions: Fall Risk    Subjective   General  Patient assessed for rehabilitation services?: Yes  Additional Pertinent Hx: UTI, ENcephalopathy, Dehydration, panlobular emphysema, COPD emphysema, since admission pt easily cantankerous/combative; requirig use of avasys, bed alarm  Referring Practitioner: Dr. Paz Harris  Diagnosis: UTI  Subjective  Subjective: Pt pleasant and cooperative for session  Pain Assessment  Pain Assessment: Faces  Kline-Baker Pain Rating: Hurts a little bit  Oxygen Therapy  SpO2: 94 %  O2 Device: None (Room air)     Social/Functional History  Social/Functional History  Type of Home: Facility  Ambulation Assistance: Needs assistance  Transfer Assistance: Needs assistance  Additional Comments: pt resided at SNF prior to admission; pt very poor historian       Objective   Vision: Within Functional Limits  Hearing: Within functional limits    Orientation  Overall Orientation Status: Within Functional Limits  Observation/Palpation  Posture: Fair  Observation: pt required re-direction throughout session;   Balance  Sitting Balance: Stand by assistance  Standing Balance: Minimal assistance  ADL  Feeding: Independent  Grooming: Stand by assistance  UE Bathing: Stand by assistance  LE Bathing: Maximum assistance  UE Dressing: Stand by assistance  LE Dressing: Maximum assistance  Toileting: Maximum assistance  Additional Comments: pt would require significant re-direction to complete all tasks due to cognitive deficits at this time         Bed mobility  Rolling to Left: Stand by assistance  Rolling to Right: Stand by assistance  Supine to Sit: Minimal assistance  Sit to Supine: Minimal assistance        Cognition  Overall Cognitive Status: Exceptions  Arousal/Alertness: Delayed responses to stimuli  Following Commands: Follows one step commands with repetition  Attention Span: Difficulty attending to directions; Difficulty dividing attention  Memory: Decreased short term memory;Decreased recall of recent events;Decreased recall of biographical Information  Safety Judgement: Decreased awareness of need for safety;Decreased awareness of need for assistance  Problem Solving: Assistance required to implement solutions;Assistance required to identify errors made;Good awareness of errors made  Insights: Decreased awareness of deficits  Initiation: Requires cues for some  Sequencing: Requires cues for some        Sensation  Overall Sensation Status: (unable to assess due to cognitive deficits)        LUE AROM (degrees)  LUE AROM : WFL  Left Hand AROM (degrees)  Left Hand AROM: WFL  RUE AROM (degrees)  RUE AROM : WFL  Right Hand AROM (degrees)  Right Hand AROM: WFL  LUE Strength  Gross LUE Strength: WFL  RUE Strength  Gross RUE Strength: WFL        Plan   Plan  Times per week: 3-5x/wk   Current Treatment Recommendations: Balance Training, Self-Care / ADL, Equipment Evaluation, Education, & procurement, Patient/Caregiver Education & Training, Functional Mobility Training, Home Management Training          Goals  Short term goals  Time Frame for Short term goals: 1 week   Short term goal 1: Pt will be SBA for total body dressing  Short term goal 2: pt will be SBA for toileting task/transfer  Short term goal 3: Pt will be SBA to stand at sink to wash hands  Patient Goals   Patient goals :  To go home        Electronically signed by Mk Hernandez OT on 4/9/2019 at 10:11 AM    Mk Hernandez OT

## 2019-04-09 NOTE — DISCHARGE SUMMARY
Hospital Discharge Summary    Fernando Argueta  :  1940  MRN:  [id number]    Admit date:  2019  Discharge date:   2019    Admitting Physician:    Aubrey Charles MD    Discharge Diagnoses:    Principal Problem:    UTI (urinary tract infection)  Active Problems:    Encephalopathy, metabolic    Dehydration    Multiple falls    Late onset Alzheimer's disease without behavioral disturbance    Unstable gait    Essential hypertension    Pure hypercholesterolemia    Panlobular emphysema ([address]  Resolved Problems:    Metabolic encephalopathy with confusion and agitation. UTI    Hospital Course: The patient was admitted for the above noted medical/surgical issues. Please see daily progress note for further details concerning their stay. Patient was recently discharged to  after short stay hospitalization. He did have some confusion and agitation at the nursing home. He was brought back and found to have urinary tract infection. Culture and sensitivities noted he is placed on the appropriate antibiotics. Medications were adjusted and social service notes were reviewed in full. Family conference held at length. The patient improved throughout their stay and reached maximum medical improvement on the day of discharge. Tuesday morning April 9 patient was awake and cooperative talkative, did have some periods of mild pleasant confusion but was deemed medically stable for discharge to skilled nursing facility. Prescription for Levaquin ×5 days provided. Other medications as listed below. The patient/family agree with the treatment plan as outlined above. All questions concerning their stay were answered prior to discharge. They understand the importance of follow up concerning any abnormal test results.      Discharge Medications:       Gerarda Sicard   Home Medication Instructions [id number]:[id number]    Printed on:19 5499   Medication Information                      aspirin 325 MG tablet  Take 325 mg by mouth daily             atorvastatin (LIPITOR) 10 MG tablet  Take 10 mg by mouth daily             divalproex (DEPAKOTE) 250 MG DR tablet  Take 250 mg by mouth 3 times daily             hydrochlorothiazide (HYDRODIURIL) 25 MG tablet  Take 25 mg by mouth daily             levofloxacin (LEVAQUIN) 500 MG tablet  Take 1 tablet by mouth daily for 5 days             lisinopril (PRINIVIL;ZESTRIL) 10 MG tablet  Take 10 mg by mouth daily             Melatonin 10 MG CAPS  Take by mouth             memantine (NAMENDA) 10 MG tablet  Take 10 mg by mouth daily              QUEtiapine (SEROQUEL) 100 MG tablet  Take 1 tablet by mouth 2 times daily             rivastigmine (EXELON) 3 MG capsule  Take 3 mg by mouth 2 times daily             tamsulosin (FLOMAX) 0.4 MG capsule  Take 1 capsule by mouth daily             traZODone (DESYREL) 100 MG tablet  Take 100 mg by mouth nightly                 Consults:     PT and OT evaluation. Significant Diagnostic Studies:    See summary of labs/x-rays/path report for further details    Treatments:     Medication adjustments, IV and by mouth antibiotics for UTI. Supportive care for chronic deconditioning    Disposition:     Follow up with Acosta baron physician assistant in  2weeks upon discharge from SNF. Discharge [facility], patient medically stable. Awaiting bed availability    Signed:  Uvaldo Rothman   4/9/2019, 7:47 AM     Pt had some agitation at [facility] related to UTI and decreased dose of meds, meds returned to original level and UTI treated patient has returned to regular pleasantly confused baseline. I have discussed the care of Camacho Braxton, including pertinent history and exam findings with the ARNP/PA. I have seen and examined the patient and the key elements of all parts of the encounter have been performed by me. I agree with the assessment and plan as outlined by the ARNP/PA.  Please refer to my separate note for complete documentation.        Plan to [facility] on Wednesday      Electronically signed by Linda Schmidt MD on 4/9/2019 at 10:40 AM

## 2019-04-09 NOTE — PROGRESS NOTES
Physical Therapy    Facility/Department: [facility] ONCOLOGY UNIT  Initial Assessment    NAME: Laurie Cortes  : 1940  MRN: [id number]    Date of Service: 2019    Assessment   Body structures, Functions, Activity limitations: Decreased functional mobility ; Decreased cognition;Decreased balance;Decreased safe awareness  Assessment: pt is at an increased risk for falls and functional decline. pt would benefit from skilled PT services to address deficits listed in evaluation to improve pt safety and QOL. Treatment Diagnosis: difficulty ambulating  Prognosis: Good  Decision Making: Low Complexity  REQUIRES PT FOLLOW UP: Yes  Activity Tolerance  Activity Tolerance: Treatment limited secondary to agitation;Patient limited by cognitive status       Patient Diagnosis(es): The primary encounter diagnosis was Encephalopathy. Diagnoses of Urinary tract infection with hematuria, site unspecified and Hypokalemia were also pertinent to this visit. has a past medical history of Alzheimer's dementia, COPD with emphysema ([facility] [address] Hyperlipidemia, Hypertension, Osteoarthritis, Palliative care patient, and Sleep disturbance. has no past surgical history on file.     Restrictions  Restrictions/Precautions  Restrictions/Precautions: Fall Risk  Vision/Hearing  Vision: Within Functional Limits  Hearing: Within functional limits     Subjective  General  Chart Reviewed: Yes  Patient assessed for rehabilitation services?: Yes  Additional Pertinent Hx: Dementia  Family / Caregiver Present: No  Diagnosis: UTI  Follows Commands: Impaired  General Comment  Comments: pt easily agitated at this time  Subjective  Subjective: pt required motivation and redirection to participate  Pain Screening  Patient Currently in Pain: Yes  Pain Assessment  Pain Assessment: Faces  Kline-Baker Pain Rating: Hurts a little bit  Vital Signs  Patient Currently in Pain: Yes       Orientation  Orientation  Overall Orientation Status: Impaired  Orientation Level: Disoriented to place; Disoriented to time;Disoriented to situation  Social/Functional History  Social/Functional History  Type of Home: Facility  Ambulation Assistance: Needs assistance  Transfer Assistance: Needs assistance  Additional Comments: pt resided at SNF prior to admission; pt very poor historian  Cognition   Cognition  Overall Cognitive Status: Exceptions  Arousal/Alertness: Delayed responses to stimuli  Following Commands:  Follows one step commands with repetition  Attention Span: Attends with cues to redirect  Memory: Decreased short term memory;Decreased recall of recent events;Decreased recall of biographical Information  Initiation: Requires cues for all  Sequencing: Requires cues for some    Objective          AROM RLE (degrees)  RLE AROM: WFL  AROM LLE (degrees)  LLE AROM : WFL  Strength RLE  Strength RLE: WFL  Strength LLE  Strength LLE: WFL     Sensation  Overall Sensation Status: (unable to assess due to cognitive deficits)  Bed mobility  Rolling to Left: Stand by assistance  Rolling to Right: Stand by assistance  Supine to Sit: Minimal assistance  Sit to Supine: Minimal assistance  Transfers  Sit to Stand: Minimal Assistance  Stand to sit: Minimal Assistance  Lateral Transfers: Unable to assess  Comment: pt stood x2 with Olegario and only tolerated stance for <30sec; difficult to assess reason for limited tolerance due to cognitive deficits  Ambulation  Ambulation?: No     Balance  Posture: Fair  Sitting - Static: Fair  Standing - Static: Poor;+(pt required BUE support and cues for erect posture)        Plan   Plan  Times per week: 7  Times per day: Daily  Plan weeks: 2  Current Treatment Recommendations: Balance Training, Functional Mobility Training, Transfer Training, Safety Education & Training, Patient/Caregiver Education & Training  Safety Devices  Type of devices: Patient at risk for falls, Bed alarm in place, Telesitter in use, Left in bed, Nurse notified    Goals  Short term goals  Short term goal 1: pt will improve supine<>sit to SBA  Short term goal 2: pt will improve sit<>stands to CGA  Short term goal 3: pt will perform stand pivot transfers with Marta Begum       Electronically signed by Suellyn Kocher on 4/9/2019 at 10:12 AM

## 2019-04-09 NOTE — PLAN OF CARE
Problem: Falls - Risk of:  Goal: Will remain free from falls  Description  Will remain free from falls  4/9/2019 1149 by Shyam Alberto RN  Outcome: Ongoing  4/9/2019 0310 by Oralia Prado RN  Outcome: Ongoing  Goal: Absence of physical injury  Description  Absence of physical injury  4/9/2019 1149 by Shyam Alberto RN  Outcome: Ongoing  4/9/2019 0310 by Oralia Prado RN  Outcome: Ongoing     Problem: Risk for Impaired Skin Integrity  Goal: Tissue integrity - skin and mucous membranes  Description  Structural intactness and normal physiological function of skin and  mucous membranes.   4/9/2019 1149 by Shyam Alberto RN  Outcome: Ongoing  4/9/2019 0310 by Oralia Prado RN  Outcome: Ongoing     Problem: Sensory:  Goal: General experience of comfort will improve  Description  General experience of comfort will improve  4/9/2019 1149 by Shyam Alberto RN  Outcome: Ongoing  4/9/2019 0310 by Oralia Prado RN  Outcome: Ongoing     Problem: Urinary Elimination:  Goal: Signs and symptoms of infection will decrease  Description  Signs and symptoms of infection will decrease  4/9/2019 1149 by Shyam Alberto RN  Outcome: Ongoing  4/9/2019 0310 by Oralia Prado RN  Outcome: Ongoing  Goal: Ability to reestablish a normal urinary elimination pattern will improve - after catheter removal  Description  Ability to reestablish a normal urinary elimination pattern will improve  4/9/2019 1149 by Shyam Alberto RN  Outcome: Ongoing  4/9/2019 0310 by Oralia Prado RN  Outcome: Ongoing  Goal: Complications related to the disease process, condition or treatment will be avoided or minimized  Description  Complications related to the disease process, condition or treatment will be avoided or minimized  4/9/2019 1149 by Shyam Alberto RN  Outcome: Ongoing  4/9/2019 0310 by Oralia Prado RN  Outcome: Ongoing     Problem: Mental Status - Impaired:  Goal: Mental status will improve  Description  Mental status will improve  4/9/2019 1149 by Shikha Manning RN  Outcome: Ongoing  4/9/2019 0310 by Debora Juarez RN  Outcome: Ongoing     Problem: Nutrition  Goal: Optimal nutrition therapy  Description  Nutrition Problem: Inadequate oral intake  Intervention: Food and/or Nutrient Delivery: Continue current diet, Start ONS  Nutritional Goals: Pt will show s/s healing     4/9/2019 1149 by Shikha Manning RN  Outcome: Ongoing  4/9/2019 0310 by Debora Juarez RN  Outcome: Ongoing     Problem: Pain:  Goal: Pain level will decrease  Description  Pain level will decrease  Outcome: Ongoing  Goal: Control of acute pain  Description  Control of acute pain  Outcome: Ongoing  Goal: Control of chronic pain  Description  Control of chronic pain  Outcome: Ongoing

## 2019-04-09 NOTE — CARE COORDINATION
SW/pt's family received bed offer from [facility]. Family accepted. Precert started with [facility] Medicare. ADAN will notify when we hear back from pt's insurance and can dc to facility.     Leanne Alberta: [phone] F: [phone]    Electronically signed by ADAN Soria on 4/9/2019 at 1:30 PM

## 2019-04-09 NOTE — CARE COORDINATION
SW received call from Son at [facility], pt denied d/t risk of behavior. After conversation with pt's daughter yesterday, open to all options and help as they know pt cannot return home right now. SW reaching out to following facilities with referrals and if beds open will discuss with family. [facility] P: [phone] F: [phone] (as our [facility] Unit full)  [address]: [phone] F: [address] P: [phone] F: [id number] [facility] P: [phone] F: [address] P: [phone] F: Vas[facility] [address] P: [phone] F: [address] P: [phone] F: [phone]    SW will continue to follow and assist as needed.   Electronically signed by Gladys Caldwell on 4/9/2019 at 10:27 AM

## 2019-04-10 VITALS
RESPIRATION RATE: 20 BRPM | SYSTOLIC BLOOD PRESSURE: 123 MMHG | WEIGHT: 200 LBS | BODY MASS INDEX: 28 KG/M2 | DIASTOLIC BLOOD PRESSURE: 74 MMHG | HEART RATE: 83 BPM | TEMPERATURE: 99 F | HEIGHT: 71 IN | OXYGEN SATURATION: 90 %

## 2019-04-10 PROCEDURE — 6370000000 HC RX 637 (ALT 250 FOR IP): Performed by: FAMILY MEDICINE

## 2019-04-10 PROCEDURE — 6360000002 HC RX W HCPCS: Performed by: FAMILY MEDICINE

## 2019-04-10 PROCEDURE — 2580000003 HC RX 258: Performed by: FAMILY MEDICINE

## 2019-04-10 PROCEDURE — 6370000000 HC RX 637 (ALT 250 FOR IP): Performed by: PHYSICIAN ASSISTANT

## 2019-04-10 RX ADMIN — DIVALPROEX SODIUM 250 MG: 250 TABLET, DELAYED RELEASE ORAL at 09:04

## 2019-04-10 RX ADMIN — LISINOPRIL 10 MG: 10 TABLET ORAL at 09:04

## 2019-04-10 RX ADMIN — RIVASTIGMINE TARTRATE 3 MG: 3 CAPSULE ORAL at 09:05

## 2019-04-10 RX ADMIN — LEVOFLOXACIN 500 MG: 250 TABLET, FILM COATED ORAL at 09:04

## 2019-04-10 RX ADMIN — ATORVASTATIN CALCIUM 10 MG: 20 TABLET, FILM COATED ORAL at 09:05

## 2019-04-10 RX ADMIN — QUETIAPINE FUMARATE 100 MG: 100 TABLET ORAL at 09:05

## 2019-04-10 RX ADMIN — MEMANTINE HYDROCHLORIDE 10 MG: 5 TABLET ORAL at 09:06

## 2019-04-10 RX ADMIN — Medication 10 ML: at 09:06

## 2019-04-10 RX ADMIN — TAMSULOSIN HYDROCHLORIDE 0.4 MG: 0.4 CAPSULE ORAL at 09:04

## 2019-04-10 RX ADMIN — ENOXAPARIN SODIUM 40 MG: 40 INJECTION SUBCUTANEOUS at 09:04

## 2019-04-10 RX ADMIN — HYDROCHLOROTHIAZIDE 25 MG: 25 TABLET ORAL at 09:04

## 2019-04-10 RX ADMIN — ASPIRIN 325 MG ORAL TABLET 325 MG: 325 PILL ORAL at 09:05

## 2019-04-10 NOTE — CARE COORDINATION
Pt accepted and insurance precert approved for pt to dc to [facility] today 4/10.  Family completing paperwork this AM and pt can dc there this PM.    Edi Noss: [phone] F: [phone]    Electronically signed by ADAN Lizarraga on 4/10/2019 at 8:16 AM

## 2019-04-10 NOTE — DISCHARGE INSTR - COC
200 lb (90.7 kg)   SpO2 90%   BMI 27.89 kg/m²     Last documented pain score (0-10 scale): Pain Level: 0  Last Weight:   Wt Readings from Last 1 Encounters:   19 200 lb (90.7 kg)     Mental Status:  disoriented    IV Access:  - None    Nursing Mobility/ADLs:  Walking   Dependent  Transfer  Dependent  Bathing  Dependent  Dressing  Dependent  Toileting  Dependent  Feeding  Dependent  Med Admin  Dependent  Med Delivery   whole    Wound Care Documentation and Therapy:        Elimination:  Continence:   · Bowel: No  · Bladder: No  Urinary Catheter: None   Colostomy/Ileostomy/Ileal Conduit: No       Date of Last BM: ***    Intake/Output Summary (Last 24 hours) at 4/10/2019 1131  Last data filed at 4/10/2019 1125  Gross per 24 hour   Intake 450 ml   Output 650 ml   Net -200 ml     I/O last 3 completed shifts:   In: 12 [P.O.:450]  Out: 650 [Urine:650]    Safety Concerns:     History of Falls (last 30 days) and altered mental status    Impairments/Disabilities:      Generalized weakness    Nutrition Therapy:  Current Nutrition Therapy:   - Oral Diet:  { JUAN R Oral OMIJ:396453584}    Routes of Feeding: {[facility] DME Other Feedings:000410139}  Liquids: {Slp liquid thickness:39459}  Daily Fluid Restriction: {[facility] DME Yes amt example:637732763}  Last Modified Barium Swallow with Video (Video Swallowing Test): {Done Not Done LRLS:173748284}    Treatments at the Time of Hospital Discharge:   Respiratory Treatments:     Oxygen Therapy:  {Therapy; copd oxygen:45285}  Ventilator:    { CC Vent CMNV:376643786}    Rehab Therapies: {THERAPEUTIC INTERVENTION:7142469222}  Weight Bearing Status/Restrictions: { CC Weight Bearin}  Other Medical Equipment (for information only, NOT a DME order):  {EQUIPMENT:709832328}  Other Treatments: ***    Patient's personal belongings (please select all that are sent with patient):  {[facility] DME Belongings:619504834}    RN SIGNATURE:  Electronically signed by Josue Nevarez RN on 4/10/19 at

## 2019-04-10 NOTE — PLAN OF CARE
Problem: Urinary Elimination:  Goal: Signs and symptoms of infection will decrease  Description  Signs and symptoms of infection will decrease  4/10/2019 0120 by Esha Yang RN  Outcome: Ongoing

## 2019-04-10 NOTE — DISCHARGE SUMMARY
Hospital Discharge Summary    Adonay Banegas  :  1940  MRN:  [id number]    Admit date:  2019  Discharge date:   April 10, 2019    Admitting Physician:    Caroline Moss MD    Discharge Diagnoses:    Principal Problem:    UTI (urinary tract infection)  Active Problems:    Encephalopathy, metabolic    Dehydration    Multiple falls    Late onset Alzheimer's disease without behavioral disturbance    Unstable gait    Essential hypertension    Pure hypercholesterolemia    Panlobular emphysema ([address]  Resolved Problems:    Metabolic encephalopathy with confusion and agitation. UTI    Hospital Course: The patient was admitted for the above noted medical/surgical issues. Please see daily progress note for further details concerning their stay. Patient was recently discharged to [facility] after short stay hospitalization. He did have some confusion and agitation at the nursing home. He was brought back and found to have urinary tract infection. Culture and sensitivities noted he is placed on the appropriate antibiotics. Medications were adjusted and social service notes were reviewed in full. Family conference held at length. The patient improved throughout their stay and reached maximum medical improvement on the day of discharge. Tuesday morning April 9 patient was awake and cooperative talkative, did have some periods of mild pleasant confusion but was deemed medically stable for discharge to skilled nursing facility. Prescription for Levaquin ×5 days provided. Other medications as listed below. The patient/family agree with the treatment plan as outlined above. All questions concerning their stay were answered prior to discharge. They understand the importance of follow up concerning any abnormal test results.      Discharge Medications:       [facility] Medication Instructions [id number]:[id number]    Printed on:04/10/19 8940   Medication Information                      aspirin 325 MG tablet  Take 325 mg by mouth daily             atorvastatin (LIPITOR) 10 MG tablet  Take 10 mg by mouth daily             divalproex (DEPAKOTE) 250 MG DR tablet  Take 250 mg by mouth 3 times daily             hydrochlorothiazide (HYDRODIURIL) 25 MG tablet  Take 25 mg by mouth daily             levofloxacin (LEVAQUIN) 500 MG tablet  Take 1 tablet by mouth daily for 5 days             lisinopril (PRINIVIL;ZESTRIL) 10 MG tablet  Take 10 mg by mouth daily             Melatonin 10 MG CAPS  Take by mouth             memantine (NAMENDA) 10 MG tablet  Take 10 mg by mouth daily              QUEtiapine (SEROQUEL) 100 MG tablet  Take 1 tablet by mouth 2 times daily             rivastigmine (EXELON) 3 MG capsule  Take 3 mg by mouth 2 times daily             tamsulosin (FLOMAX) 0.4 MG capsule  Take 1 capsule by mouth daily             traZODone (DESYREL) 100 MG tablet  Take 100 mg by mouth nightly                 Consults:     PT and OT evaluation. Significant Diagnostic Studies:    See summary of labs/x-rays/path report for further details    Treatments:     Medication adjustments, IV and by mouth antibiotics for UTI. Supportive care for chronic deconditioning    Disposition:     Follow up with Ольга baron physician assistant in  2weeks upon discharge from SNF. Discharge [facility], patient medically stable. Awaiting bed availability    Signed:  Esteban Rios   4/10/2019, 8:22 AM       Electronically signed by Esteban Rios PA-C on 4/10/2019 at 8:22 AM      Much better - ok to d/c to [facility]. Keep f/u if d/c from SNF    I have discussed the care of Katherine Cha, including pertinent history and exam findings with the ARNP/PA. I have seen and examined the patient and the key elements of all parts of the encounter have been performed by me. I agree with the assessment and plan as outlined by the ARNP/PA. Please refer to my separate note for complete documentation.      Electronically signed by Prema Hanks

## 2019-04-14 ENCOUNTER — HOSPITAL ENCOUNTER (EMERGENCY)
Age: 79
Discharge: HOME | End: 2019-04-15
Attending: EMERGENCY MEDICINE
Payer: MEDICARE

## 2019-04-14 VITALS
HEIGHT: 71 IN | DIASTOLIC BLOOD PRESSURE: 76 MMHG | HEART RATE: 84 BPM | SYSTOLIC BLOOD PRESSURE: 120 MMHG | WEIGHT: 200 LBS | BODY MASS INDEX: 28 KG/M2 | RESPIRATION RATE: 20 BRPM | OXYGEN SATURATION: 96 % | TEMPERATURE: 98 F

## 2019-04-14 DIAGNOSIS — E87.6 HYPOKALEMIA: ICD-10-CM

## 2019-04-14 DIAGNOSIS — F05 SUNDOWN SYNDROME: Primary | ICD-10-CM

## 2019-04-14 DIAGNOSIS — G30.1 LATE ONSET ALZHEIMER'S DISEASE WITHOUT BEHAVIORAL DISTURBANCE (HCC): Chronic | ICD-10-CM

## 2019-04-14 DIAGNOSIS — F02.80 LATE ONSET ALZHEIMER'S DISEASE WITHOUT BEHAVIORAL DISTURBANCE (HCC): Chronic | ICD-10-CM

## 2019-04-14 LAB
ALBUMIN SERPL-MCNC: 3.3 G/DL (ref 3.5–5.2)
ALP BLD-CCNC: 55 U/L (ref 40–130)
ALT SERPL-CCNC: 23 U/L (ref 5–41)
ANION GAP SERPL CALCULATED.3IONS-SCNC: 13 MMOL/L (ref 7–19)
AST SERPL-CCNC: 29 U/L (ref 5–40)
BACTERIA: NEGATIVE /HPF
BASOPHILS ABSOLUTE: 0.1 K/UL (ref 0–0.2)
BASOPHILS RELATIVE PERCENT: 1.1 % (ref 0–1)
BILIRUB SERPL-MCNC: <0.2 MG/DL (ref 0.2–1.2)
BILIRUBIN URINE: NEGATIVE
BLOOD, URINE: NEGATIVE
BUN BLDV-MCNC: 10 MG/DL (ref 8–23)
CALCIUM SERPL-MCNC: 9.5 MG/DL (ref 8.8–10.2)
CHLORIDE BLD-SCNC: 96 MMOL/L (ref 98–111)
CLARITY: CLEAR
CO2: 30 MMOL/L (ref 22–29)
COLOR: YELLOW
CREAT SERPL-MCNC: 0.7 MG/DL (ref 0.5–1.2)
EOSINOPHILS ABSOLUTE: 0.1 K/UL (ref 0–0.6)
EOSINOPHILS RELATIVE PERCENT: 1.7 % (ref 0–5)
EPITHELIAL CELLS, UA: 1 /HPF (ref 0–5)
GFR NON-AFRICAN AMERICAN: >60
GLUCOSE BLD-MCNC: 63 MG/DL (ref 74–109)
GLUCOSE URINE: NEGATIVE MG/DL
HCT VFR BLD CALC: 44.5 % (ref 42–52)
HEMOGLOBIN: 13.7 G/DL (ref 14–18)
HYALINE CASTS: 0 /HPF (ref 0–8)
KETONES, URINE: NEGATIVE MG/DL
LEUKOCYTE ESTERASE, URINE: ABNORMAL
LYMPHOCYTES ABSOLUTE: 1.7 K/UL (ref 1.1–4.5)
LYMPHOCYTES RELATIVE PERCENT: 21 % (ref 20–40)
MCH RBC QN AUTO: 32.2 PG (ref 27–31)
MCHC RBC AUTO-ENTMCNC: 30.8 G/DL (ref 33–37)
MCV RBC AUTO: 104.5 FL (ref 80–94)
MONOCYTES ABSOLUTE: 1.2 K/UL (ref 0–0.9)
MONOCYTES RELATIVE PERCENT: 14.5 % (ref 0–10)
NEUTROPHILS ABSOLUTE: 4.8 K/UL (ref 1.5–7.5)
NEUTROPHILS RELATIVE PERCENT: 59.6 % (ref 50–65)
NITRITE, URINE: NEGATIVE
PDW BLD-RTO: 14.3 % (ref 11.5–14.5)
PH UA: 6.5 (ref 5–8)
PLATELET # BLD: 242 K/UL (ref 130–400)
PMV BLD AUTO: 9.1 FL (ref 9.4–12.4)
POTASSIUM SERPL-SCNC: 2.8 MMOL/L (ref 3.5–5)
PROTEIN UA: NEGATIVE MG/DL
RBC # BLD: 4.26 M/UL (ref 4.7–6.1)
RBC UA: 3 /HPF (ref 0–4)
REASON FOR REJECTION: NORMAL
REJECTED TEST: NORMAL
SODIUM BLD-SCNC: 139 MMOL/L (ref 136–145)
SPECIFIC GRAVITY UA: 1.01 (ref 1–1.03)
TOTAL PROTEIN: 6.2 G/DL (ref 6.6–8.7)
URINE REFLEX TO CULTURE: YES
UROBILINOGEN, URINE: 0.2 E.U./DL
VALPROIC ACID LEVEL: 49.7 UG/ML (ref 50–100)
WBC # BLD: 8.1 K/UL (ref 4.8–10.8)
WBC UA: 4 /HPF (ref 0–5)

## 2019-04-14 PROCEDURE — 36415 COLL VENOUS BLD VENIPUNCTURE: CPT

## 2019-04-14 PROCEDURE — 80053 COMPREHEN METABOLIC PANEL: CPT

## 2019-04-14 PROCEDURE — 80164 ASSAY DIPROPYLACETIC ACD TOT: CPT

## 2019-04-14 PROCEDURE — 99285 EMERGENCY DEPT VISIT HI MDM: CPT

## 2019-04-14 PROCEDURE — 85025 COMPLETE CBC W/AUTO DIFF WBC: CPT

## 2019-04-14 PROCEDURE — 99285 EMERGENCY DEPT VISIT HI MDM: CPT | Performed by: EMERGENCY MEDICINE

## 2019-04-14 PROCEDURE — 81001 URINALYSIS AUTO W/SCOPE: CPT

## 2019-04-14 PROCEDURE — 87186 SC STD MICRODIL/AGAR DIL: CPT

## 2019-04-14 PROCEDURE — 87086 URINE CULTURE/COLONY COUNT: CPT

## 2019-04-14 PROCEDURE — 86403 PARTICLE AGGLUT ANTBDY SCRN: CPT

## 2019-04-14 PROCEDURE — 6370000000 HC RX 637 (ALT 250 FOR IP): Performed by: EMERGENCY MEDICINE

## 2019-04-14 RX ORDER — LORAZEPAM 1 MG/1
1 TABLET ORAL ONCE
Status: COMPLETED | OUTPATIENT
Start: 2019-04-14 | End: 2019-04-14

## 2019-04-14 RX ORDER — OLANZAPINE 10 MG/1
5 TABLET, ORALLY DISINTEGRATING ORAL ONCE
Status: COMPLETED | OUTPATIENT
Start: 2019-04-14 | End: 2019-04-14

## 2019-04-14 RX ORDER — POTASSIUM CHLORIDE 20MEQ/15ML
40 LIQUID (ML) ORAL ONCE
Status: COMPLETED | OUTPATIENT
Start: 2019-04-14 | End: 2019-04-14

## 2019-04-14 RX ORDER — HYDROCODONE BITARTRATE AND ACETAMINOPHEN 5; 325 MG/1; MG/1
1 TABLET ORAL ONCE
Status: COMPLETED | OUTPATIENT
Start: 2019-04-14 | End: 2019-04-14

## 2019-04-14 RX ADMIN — OLANZAPINE 5 MG: 10 TABLET, ORALLY DISINTEGRATING ORAL at 23:03

## 2019-04-14 RX ADMIN — HYDROCODONE BITARTRATE AND ACETAMINOPHEN 1 TABLET: 5; 325 TABLET ORAL at 23:03

## 2019-04-14 RX ADMIN — LORAZEPAM 1 MG: 1 TABLET ORAL at 23:03

## 2019-04-14 RX ADMIN — POTASSIUM CHLORIDE 40 MEQ: 20 SOLUTION ORAL at 23:48

## 2019-04-14 ASSESSMENT — PAIN SCALES - GENERAL: PAINLEVEL_OUTOF10: 5

## 2019-04-15 NOTE — ED NOTES
Bed: 10  Expected date:   Expected time:   Means of arrival:   Comments:  INDRA Talbert  04/14/19 2038

## 2019-04-15 NOTE — ED NOTES
Patient placed in a gown Patient placed on cardiac monitor, continuous pulse oximeter, and NIBP monitor.  Monitor alarms on.       Daniel Blood RN  04/14/19 1864

## 2019-04-15 NOTE — ED NOTES
[facility] HERE INFORMED THEM HE SADAF BE GOING BACK  [facility], [address] [place] [address]  04/14/19 6515

## 2019-04-15 NOTE — ED NOTES
Pt incontineint of urine.   Linens changed red rash noted where diaper was     Beena Brasher RN  04/14/19 2125       Beena Brasher RN  04/14/19 2134

## 2019-04-15 NOTE — ED PROVIDER NOTES
[facility] EMERGENCY DEPT  eMERGENCY dEPARTMENT eNCOUnter      Pt Name: Negrita Beatty  MRN: [id number]  [place] 1940  Date of evaluation: 4/14/2019  Provider: Ivy Davidson MD    CHIEF COMPLAINT       Chief Complaint   Patient presents with    Fall     unwitnessed fall at the nursing home hx dementia         HISTORY OF PRESENT ILLNESS   (Location/Symptom, Timing/Onset,Context/Setting, Quality, Duration, Modifying Factors, Severity)  Note limiting factors. Negrita Beatty is a 67 y.o. male who presents to the emergency department evaluation possible fall and mental status change. 72-year-old male in for possible fall. No obvious injuries no pain complaints. He states he tripped him. He is very delusional. History of Alzheimer's disease. 2 admissions to the hospital since the end of March. He is in his 2nd nursing home placement facility the [facility] didn't accept him back because his dementia. His wife states they looked into psychiatric facility at [place] an Alzheimer's unit. But albuterol. The patient because they have an Alzheimer's unit. The wife states the patient had a good day today and then tonight the patient became unruly and it appears she's having some sundowning. Speaking to live his Seroquel dose was decreased recently. The patient was mostly cooperative until the wife showed up and any became more agitated. They've been  54 years. She recently moved to the [facility] about 2-1/2 months ago. That to a point where she couldn't physically help him anymore. The history is provided by the patient, the spouse and medical records. NursingNotes were reviewed. REVIEW OF SYSTEMS    (2-9 systems for level 4, 10 or more for level 5)     Review of Systems   Unable to perform ROS: Dementia (From the wife no other acute changes. )       A complete review of systems was performed and is negative except as noted above in the HPI.        PAST MEDICAL HISTORY     Past Medical History: Diagnosis Date    Alzheimer's dementia     COPD with emphysema ([facility] [address]     Hyperlipidemia     Hypertension     Osteoarthritis     Palliative care patient 04/01/2019    Sleep disturbance          SURGICAL HISTORY     History reviewed. No pertinent surgical history. CURRENT MEDICATIONS       Previous Medications    ASPIRIN 325 MG TABLET    Take 325 mg by mouth daily    ATORVASTATIN (LIPITOR) 10 MG TABLET    Take 10 mg by mouth daily    DIVALPROEX (DEPAKOTE) 250 MG DR TABLET    Take 250 mg by mouth 3 times daily    HYDROCHLOROTHIAZIDE (HYDRODIURIL) 25 MG TABLET    Take 25 mg by mouth daily    LISINOPRIL (PRINIVIL;ZESTRIL) 10 MG TABLET    Take 10 mg by mouth daily    MELATONIN 10 MG CAPS    Take by mouth    MEMANTINE (NAMENDA) 10 MG TABLET    Take 10 mg by mouth daily     QUETIAPINE (SEROQUEL) 100 MG TABLET    Take 1 tablet by mouth 2 times daily    RIVASTIGMINE (EXELON) 3 MG CAPSULE    Take 3 mg by mouth 2 times daily    TAMSULOSIN (FLOMAX) 0.4 MG CAPSULE    Take 1 capsule by mouth daily    TRAZODONE (DESYREL) 100 MG TABLET    Take 100 mg by mouth nightly       ALLERGIES     Patient has no known allergies. FAMILY HISTORY     History reviewed. No pertinent family history. SOCIAL HISTORY       Social History     Socioeconomic History    Marital status:      Spouse name: None    Number of children: None    Years of education: None    Highest education level: None   Occupational History    None   Social Needs    Financial resource strain: None    Food insecurity:     Worry: None     Inability: None    Transportation needs:     Medical: None     Non-medical: None   Tobacco Use    Smoking status: Current Every Day Smoker     Types: Cigarettes     Start date: 1953    Smokeless tobacco: Never Used   Substance and Sexual Activity    Alcohol use:  Yes     Alcohol/week: 2.4 oz     Types: 4 Glasses of wine per week     Frequency: Monthly or less     Drinks per session: 1 or 2     Binge frequency: Never    Drug use: Never    Sexual activity: Not Currently   Lifestyle    Physical activity:     Days per week: None     Minutes per session: None    Stress: None   Relationships    Social connections:     Talks on phone: None     Gets together: None     Attends [employer] service: None     Active member of club or organization: None     Attends meetings of clubs or organizations: None     Relationship status: None    Intimate partner violence:     Fear of current or ex partner: None     Emotionally abused: None     Physically abused: None     Forced sexual activity: None   Other Topics Concern    None   Social History Narrative    None       SCREENINGS             PHYSICAL EXAM    (up to 7 for level 4, 8 or more for level 5)     ED Triage Vitals [04/14/19 2045]   BP Temp Temp src Pulse Resp SpO2 Height Weight   115/77 98 °F (36.7 °C) -- 88 20 92 % 5' 11\" (1.803 m) 200 lb (90.7 kg)       Physical Exam   Constitutional: He appears well-developed. Elderly old male who appears his stated age   HENT:   Head: Normocephalic and atraumatic. Right Ear: External ear normal.   Left Ear: External ear normal.   Nose: Nose normal.   Mouth/Throat: Oropharynx is clear and moist.   Eyes: Pupils are equal, round, and reactive to light. Conjunctivae and EOM are normal.   Neck: Normal range of motion. Neck supple. Cardiovascular: Normal rate, regular rhythm, normal heart sounds and intact distal pulses. Pulmonary/Chest: Effort normal and breath sounds normal.   Abdominal: Soft. Bowel sounds are normal.   Musculoskeletal: Normal range of motion. He exhibits no deformity. I don't see any evidence of trauma or injury. The patient can move all major joints. He does have arthritis. Neurological: He is alert. GCS eye subscore is 4. GCS verbal subscore is 4. GCS motor subscore is 6. Patient has equal strength throughout. Skin: Skin is warm and dry. He is not diaphoretic.    Psychiatric: Thought content is delusional. Cognition and memory are impaired. He expresses no homicidal and no suicidal ideation. On arrival the patient was cooperative when his wife came he seemed to be very much more agitated. Nursing note and vitals reviewed.       DIAGNOSTIC RESULTS     EKG: All EKG's are interpreted by the Emergency Department Physician who either signs or Co-signs this chart in the absence of a cardiologist.        RADIOLOGY:   Non-plain film images such as CT, Ultrasound and MRI are read by the radiologist. [facility] images are visualized and preliminarily interpreted by the emergency physician with the below findings:        Interpretation per the Radiologist below, if available at the time of this note:    No orders to display         ED BEDSIDE ULTRASOUND:   Performed by ED Physician - none    LABS:  Labs Reviewed   URINE RT REFLEX TO CULTURE - Abnormal; Notable for the following components:       Result Value    Leukocyte Esterase, Urine TRACE (*)     All other components within normal limits   CBC WITH AUTO DIFFERENTIAL - Abnormal; Notable for the following components:    RBC 4.26 (*)     Hemoglobin 13.7 (*)     .5 (*)     MCH 32.2 (*)     MCHC 30.8 (*)     MPV 9.1 (*)     Monocytes % 14.5 (*)     Basophils % 1.1 (*)     Monocytes # 1.20 (*)     All other components within normal limits   COMPREHENSIVE METABOLIC PANEL - Abnormal; Notable for the following components:    Potassium 2.8 (*)     Chloride 96 (*)     CO2 30 (*)     Glucose 63 (*)     Total Protein 6.2 (*)     Alb 3.3 (*)     All other components within normal limits    Narrative:     Demarco Cortes RN in ED specimen QNS - Phleb to draw specimen  CALL  To  [address] tel. ,  Chemistry results called to and read back by Demarco Cortes RN in ED, 04/14/2019 22:10,  by SHEILA   VALPROIC ACID LEVEL, TOTAL - Abnormal; Notable for the following components:    Valproic Acid Lvl 49.7 (*)     All other components within normal limits   URINE CULTURE   MICROSCOPIC URINALYSIS SPECIMEN REJECTION       All other labs were within normal range or not returned as of this dictation. EMERGENCY DEPARTMENT COURSE and DIFFERENTIALDIAGNOSIS/MDM:   Vitals:    Vitals:    04/14/19 2045 04/14/19 2150 04/14/19 2358   BP: 115/77 124/76 120/76   Pulse: 88 84 84   Resp: 20 20 20   Temp: 98 °F (36.7 °C)     SpO2: 92% 94% 96%   Weight: 200 lb (90.7 kg)     Height: 5' 11\" (1.803 m)         MDM  Number of Diagnoses or Management Options  Hypokalemia:   Late onset Alzheimer's disease without behavioral disturbance:   SunDown syndrome:   Diagnosis management comments: Patient with dementia having some sundowning reaction. More agitated with wife present. Potassium is low history of such. I don't see replacement potassium on his medicine list. I spoke to the on-call physician. Our hope is that with medication the patient will calm down and go to sleep and be transported back to his facility were further medication adjustments can occur. Were not willing to readmit the patient this time but does cause more aggravation with his dementia. Wife is not interested but doesn't think we can place him in a psychiatric or Alzheimer's unit. So will try this with the hope of good results. I found no evidence of injury from alleged fall. I did not do any x-rays or repeat scans. The patient had CT and MR of the brain recently at this institution. No evidence of head injury. CONSULTS:  IP CONSULT TO FAMILY MEDICINE  I discussed the case with Dr. Deniz Almeida:  Unless otherwise notedbelow, none     Procedures    FINAL IMPRESSION     1. SunDown syndrome    2. 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  Hypokalemia          DISPOSITION/PLAN   DISPOSITION Discharge - Pending Orders Complete 04/14/2019 11:54:59 PM      PATIENT REFERRED TO:  @FUP@    DISCHARGE MEDICATIONS:  New Prescriptions    No medications on file          (Please note that portions of this note were completed with a

## 2019-04-17 LAB
ORGANISM: ABNORMAL
URINE CULTURE, ROUTINE: ABNORMAL
URINE CULTURE, ROUTINE: ABNORMAL

## 2019-04-19 ENCOUNTER — OUTSIDE FACILITY SERVICE (OUTPATIENT)
Dept: CARDIOLOGY | Facility: CLINIC | Age: 79
End: 2019-04-19

## 2019-04-19 PROCEDURE — 93010 ELECTROCARDIOGRAM REPORT: CPT | Performed by: INTERNAL MEDICINE

## 2019-05-09 PROBLEM — E86.0 DEHYDRATION: Status: RESOLVED | Noted: 2019-03-30 | Resolved: 2019-05-09

## 2019-05-09 PROBLEM — N39.0 UTI (URINARY TRACT INFECTION): Status: RESOLVED | Noted: 2019-04-05 | Resolved: 2019-05-09
